# Patient Record
Sex: FEMALE | Race: WHITE | NOT HISPANIC OR LATINO | Employment: OTHER | URBAN - METROPOLITAN AREA
[De-identification: names, ages, dates, MRNs, and addresses within clinical notes are randomized per-mention and may not be internally consistent; named-entity substitution may affect disease eponyms.]

---

## 2017-09-07 ENCOUNTER — TRANSCRIBE ORDERS (OUTPATIENT)
Dept: ADMINISTRATIVE | Facility: HOSPITAL | Age: 57
End: 2017-09-07

## 2017-12-15 ENCOUNTER — TRANSCRIBE ORDERS (OUTPATIENT)
Dept: MAMMOGRAPHY | Facility: HOSPITAL | Age: 57
End: 2017-12-15

## 2017-12-15 DIAGNOSIS — Z12.31 VISIT FOR SCREENING MAMMOGRAM: Primary | ICD-10-CM

## 2018-01-05 ENCOUNTER — HOSPITAL ENCOUNTER (OUTPATIENT)
Dept: MAMMOGRAPHY | Facility: HOSPITAL | Age: 58
Discharge: HOME | End: 2018-01-05
Attending: OBSTETRICS & GYNECOLOGY | Admitting: OBSTETRICS & GYNECOLOGY

## 2018-01-05 DIAGNOSIS — Z12.31 VISIT FOR SCREENING MAMMOGRAM: ICD-10-CM

## 2018-01-05 PROCEDURE — 77063 BREAST TOMOSYNTHESIS BI: CPT

## 2018-01-05 PROCEDURE — 77067 SCR MAMMO BI INCL CAD: CPT

## 2018-01-05 PROCEDURE — 77067 SCR MAMMO BI INCL CAD: CPT | Performed by: RADIOLOGY

## 2018-01-05 PROCEDURE — 77063 BREAST TOMOSYNTHESIS BI: CPT | Performed by: RADIOLOGY

## 2018-08-04 ENCOUNTER — HOSPITAL ENCOUNTER (INPATIENT)
Facility: HOSPITAL | Age: 58
LOS: 3 days | Discharge: HOME | End: 2018-08-07
Attending: EMERGENCY MEDICINE | Admitting: INTERNAL MEDICINE

## 2018-08-04 DIAGNOSIS — R07.2 PRECORDIAL PAIN: ICD-10-CM

## 2018-08-04 DIAGNOSIS — K85.90 ACUTE PANCREATITIS WITHOUT INFECTION OR NECROSIS, UNSPECIFIED PANCREATITIS TYPE: Primary | ICD-10-CM

## 2018-08-04 PROBLEM — C50.919 BREAST CANCER (HCC): Status: ACTIVE | Noted: 2018-08-04

## 2018-08-04 PROBLEM — D72.829 LEUKOCYTOSIS: Status: ACTIVE | Noted: 2018-08-04

## 2018-08-04 LAB
ALBUMIN SERPL-MCNC: 4.37 G/DL (ref 3.2–4.8)
ALBUMIN/GLOB SERPL: 1.5 G/DL (ref 1.5–2.5)
ALP SERPL-CCNC: 80 U/L (ref 25–100)
ALT SERPL W P-5'-P-CCNC: 23 U/L (ref 7–40)
ANION GAP SERPL CALCULATED.3IONS-SCNC: 9 MMOL/L (ref 3–11)
ARTICHOKE IGE QN: 140 MG/DL (ref 0–130)
AST SERPL-CCNC: 23 U/L (ref 0–33)
BACTERIA UR QL AUTO: ABNORMAL /HPF
BASOPHILS # BLD AUTO: 0.03 10*3/MM3 (ref 0–0.2)
BASOPHILS NFR BLD AUTO: 0.3 % (ref 0–1)
BILIRUB SERPL-MCNC: 0.4 MG/DL (ref 0.3–1.2)
BILIRUB UR QL STRIP: NEGATIVE
BUN BLD-MCNC: 13 MG/DL (ref 9–23)
BUN/CREAT SERPL: 15.3 (ref 7–25)
CALCIUM SPEC-SCNC: 9.1 MG/DL (ref 8.7–10.4)
CHLORIDE SERPL-SCNC: 105 MMOL/L (ref 99–109)
CHOLEST SERPL-MCNC: 197 MG/DL (ref 0–200)
CLARITY UR: ABNORMAL
CO2 SERPL-SCNC: 28 MMOL/L (ref 20–31)
COLOR UR: YELLOW
CREAT BLD-MCNC: 0.85 MG/DL (ref 0.6–1.3)
DEPRECATED RDW RBC AUTO: 40.3 FL (ref 37–54)
EOSINOPHIL # BLD AUTO: 0.22 10*3/MM3 (ref 0–0.3)
EOSINOPHIL NFR BLD AUTO: 1.9 % (ref 0–3)
ERYTHROCYTE [DISTWIDTH] IN BLOOD BY AUTOMATED COUNT: 12.3 % (ref 11.3–14.5)
GFR SERPL CREATININE-BSD FRML MDRD: 69 ML/MIN/1.73
GLOBULIN UR ELPH-MCNC: 2.8 GM/DL
GLUCOSE BLD-MCNC: 100 MG/DL (ref 70–100)
GLUCOSE UR STRIP-MCNC: NEGATIVE MG/DL
HCT VFR BLD AUTO: 43.3 % (ref 34.5–44)
HDLC SERPL-MCNC: 67 MG/DL (ref 40–60)
HGB BLD-MCNC: 14.8 G/DL (ref 11.5–15.5)
HGB UR QL STRIP.AUTO: NEGATIVE
HOLD SPECIMEN: NORMAL
HOLD SPECIMEN: NORMAL
HYALINE CASTS UR QL AUTO: ABNORMAL /LPF
IMM GRANULOCYTES # BLD: 0.04 10*3/MM3 (ref 0–0.03)
IMM GRANULOCYTES NFR BLD: 0.3 % (ref 0–0.6)
KETONES UR QL STRIP: NEGATIVE
LEUKOCYTE ESTERASE UR QL STRIP.AUTO: NEGATIVE
LIPASE SERPL-CCNC: >3500 U/L (ref 6–51)
LYMPHOCYTES # BLD AUTO: 1.55 10*3/MM3 (ref 0.6–4.8)
LYMPHOCYTES NFR BLD AUTO: 13.5 % (ref 24–44)
MCH RBC QN AUTO: 31 PG (ref 27–31)
MCHC RBC AUTO-ENTMCNC: 34.2 G/DL (ref 32–36)
MCV RBC AUTO: 90.6 FL (ref 80–99)
MONOCYTES # BLD AUTO: 0.74 10*3/MM3 (ref 0–1)
MONOCYTES NFR BLD AUTO: 6.4 % (ref 0–12)
NEUTROPHILS # BLD AUTO: 8.97 10*3/MM3 (ref 1.5–8.3)
NEUTROPHILS NFR BLD AUTO: 77.9 % (ref 41–71)
NITRITE UR QL STRIP: NEGATIVE
PH UR STRIP.AUTO: 8.5 [PH] (ref 5–8)
PLATELET # BLD AUTO: 301 10*3/MM3 (ref 150–450)
PMV BLD AUTO: 8.8 FL (ref 6–12)
POTASSIUM BLD-SCNC: 4.2 MMOL/L (ref 3.5–5.5)
PROT SERPL-MCNC: 7.2 G/DL (ref 5.7–8.2)
PROT UR QL STRIP: ABNORMAL
RBC # BLD AUTO: 4.78 10*6/MM3 (ref 3.89–5.14)
RBC # UR: ABNORMAL /HPF
REF LAB TEST METHOD: ABNORMAL
SODIUM BLD-SCNC: 142 MMOL/L (ref 132–146)
SP GR UR STRIP: 1.02 (ref 1–1.03)
SQUAMOUS #/AREA URNS HPF: ABNORMAL /HPF
TRIGL SERPL-MCNC: 86 MG/DL (ref 0–150)
TROPONIN I SERPL-MCNC: <0.006 NG/ML
UROBILINOGEN UR QL STRIP: ABNORMAL
WBC NRBC COR # BLD: 11.51 10*3/MM3 (ref 3.5–10.8)
WBC UR QL AUTO: ABNORMAL /HPF
WHOLE BLOOD HOLD SPECIMEN: NORMAL
WHOLE BLOOD HOLD SPECIMEN: NORMAL

## 2018-08-04 PROCEDURE — 25010000002 ONDANSETRON PER 1 MG: Performed by: EMERGENCY MEDICINE

## 2018-08-04 PROCEDURE — 25010000002 MORPHINE PER 10 MG: Performed by: EMERGENCY MEDICINE

## 2018-08-04 PROCEDURE — 93005 ELECTROCARDIOGRAM TRACING: CPT | Performed by: EMERGENCY MEDICINE

## 2018-08-04 PROCEDURE — 83690 ASSAY OF LIPASE: CPT | Performed by: EMERGENCY MEDICINE

## 2018-08-04 PROCEDURE — 85025 COMPLETE CBC W/AUTO DIFF WBC: CPT | Performed by: EMERGENCY MEDICINE

## 2018-08-04 PROCEDURE — 25010000002 HYDROMORPHONE PER 4 MG: Performed by: INTERNAL MEDICINE

## 2018-08-04 PROCEDURE — G0378 HOSPITAL OBSERVATION PER HR: HCPCS

## 2018-08-04 PROCEDURE — 99220 PR INITIAL OBSERVATION CARE/DAY 70 MINUTES: CPT | Performed by: INTERNAL MEDICINE

## 2018-08-04 PROCEDURE — 80053 COMPREHEN METABOLIC PANEL: CPT | Performed by: EMERGENCY MEDICINE

## 2018-08-04 PROCEDURE — 81001 URINALYSIS AUTO W/SCOPE: CPT | Performed by: EMERGENCY MEDICINE

## 2018-08-04 PROCEDURE — 84484 ASSAY OF TROPONIN QUANT: CPT | Performed by: EMERGENCY MEDICINE

## 2018-08-04 PROCEDURE — 80061 LIPID PANEL: CPT | Performed by: INTERNAL MEDICINE

## 2018-08-04 PROCEDURE — 25010000002 ONDANSETRON PER 1 MG: Performed by: INTERNAL MEDICINE

## 2018-08-04 PROCEDURE — 99285 EMERGENCY DEPT VISIT HI MDM: CPT

## 2018-08-04 RX ORDER — HYDROMORPHONE HYDROCHLORIDE 1 MG/ML
0.5 INJECTION, SOLUTION INTRAMUSCULAR; INTRAVENOUS; SUBCUTANEOUS
Status: DISCONTINUED | OUTPATIENT
Start: 2018-08-04 | End: 2018-08-07 | Stop reason: HOSPADM

## 2018-08-04 RX ORDER — CETIRIZINE HYDROCHLORIDE 10 MG/1
10 TABLET ORAL DAILY
Status: DISCONTINUED | OUTPATIENT
Start: 2018-08-05 | End: 2018-08-07 | Stop reason: HOSPADM

## 2018-08-04 RX ORDER — SODIUM CHLORIDE 0.9 % (FLUSH) 0.9 %
10 SYRINGE (ML) INJECTION AS NEEDED
Status: DISCONTINUED | OUTPATIENT
Start: 2018-08-04 | End: 2018-08-07 | Stop reason: HOSPADM

## 2018-08-04 RX ORDER — HYDROCODONE BITARTRATE AND ACETAMINOPHEN 5; 325 MG/1; MG/1
1 TABLET ORAL EVERY 4 HOURS PRN
Status: DISCONTINUED | OUTPATIENT
Start: 2018-08-04 | End: 2018-08-05

## 2018-08-04 RX ORDER — MORPHINE SULFATE 4 MG/ML
4 INJECTION, SOLUTION INTRAMUSCULAR; INTRAVENOUS ONCE
Status: COMPLETED | OUTPATIENT
Start: 2018-08-04 | End: 2018-08-04

## 2018-08-04 RX ORDER — NALOXONE HCL 0.4 MG/ML
0.4 VIAL (ML) INJECTION
Status: DISCONTINUED | OUTPATIENT
Start: 2018-08-04 | End: 2018-08-07 | Stop reason: HOSPADM

## 2018-08-04 RX ORDER — DOCUSATE SODIUM 100 MG/1
100 CAPSULE, LIQUID FILLED ORAL 2 TIMES DAILY
Status: DISCONTINUED | OUTPATIENT
Start: 2018-08-04 | End: 2018-08-07 | Stop reason: HOSPADM

## 2018-08-04 RX ORDER — FLUTICASONE PROPIONATE 50 MCG
2 SPRAY, SUSPENSION (ML) NASAL DAILY
Status: DISCONTINUED | OUTPATIENT
Start: 2018-08-05 | End: 2018-08-07 | Stop reason: HOSPADM

## 2018-08-04 RX ORDER — SODIUM CHLORIDE 0.9 % (FLUSH) 0.9 %
1-10 SYRINGE (ML) INJECTION AS NEEDED
Status: DISCONTINUED | OUTPATIENT
Start: 2018-08-04 | End: 2018-08-07 | Stop reason: HOSPADM

## 2018-08-04 RX ORDER — ASPIRIN 81 MG/1
81 TABLET, CHEWABLE ORAL DAILY
Status: DISCONTINUED | OUTPATIENT
Start: 2018-08-05 | End: 2018-08-07 | Stop reason: HOSPADM

## 2018-08-04 RX ORDER — FLUTICASONE PROPIONATE 50 MCG
2 SPRAY, SUSPENSION (ML) NASAL DAILY
COMMUNITY

## 2018-08-04 RX ORDER — SODIUM CHLORIDE 9 MG/ML
150 INJECTION, SOLUTION INTRAVENOUS CONTINUOUS
Status: DISCONTINUED | OUTPATIENT
Start: 2018-08-04 | End: 2018-08-06

## 2018-08-04 RX ORDER — ASPIRIN 81 MG/1
81 TABLET, CHEWABLE ORAL DAILY
COMMUNITY

## 2018-08-04 RX ORDER — ACETAMINOPHEN 325 MG/1
650 TABLET ORAL EVERY 4 HOURS PRN
Status: DISCONTINUED | OUTPATIENT
Start: 2018-08-04 | End: 2018-08-05

## 2018-08-04 RX ORDER — ONDANSETRON 2 MG/ML
4 INJECTION INTRAMUSCULAR; INTRAVENOUS ONCE
Status: COMPLETED | OUTPATIENT
Start: 2018-08-04 | End: 2018-08-04

## 2018-08-04 RX ORDER — ONDANSETRON 2 MG/ML
4 INJECTION INTRAMUSCULAR; INTRAVENOUS EVERY 6 HOURS PRN
Status: DISCONTINUED | OUTPATIENT
Start: 2018-08-04 | End: 2018-08-07 | Stop reason: HOSPADM

## 2018-08-04 RX ADMIN — ONDANSETRON 4 MG: 2 INJECTION INTRAMUSCULAR; INTRAVENOUS at 20:59

## 2018-08-04 RX ADMIN — HYDROMORPHONE HYDROCHLORIDE 0.5 MG: 1 INJECTION, SOLUTION INTRAMUSCULAR; INTRAVENOUS; SUBCUTANEOUS at 17:32

## 2018-08-04 RX ADMIN — ONDANSETRON 4 MG: 2 INJECTION INTRAMUSCULAR; INTRAVENOUS at 14:53

## 2018-08-04 RX ADMIN — MORPHINE SULFATE 4 MG: 4 INJECTION, SOLUTION INTRAMUSCULAR; INTRAVENOUS at 12:09

## 2018-08-04 RX ADMIN — SODIUM CHLORIDE 500 ML: 9 INJECTION, SOLUTION INTRAVENOUS at 14:54

## 2018-08-04 RX ADMIN — SODIUM CHLORIDE 1000 ML: 9 INJECTION, SOLUTION INTRAVENOUS at 12:09

## 2018-08-04 RX ADMIN — HYDROMORPHONE HYDROCHLORIDE 0.5 MG: 1 INJECTION, SOLUTION INTRAMUSCULAR; INTRAVENOUS; SUBCUTANEOUS at 14:53

## 2018-08-04 RX ADMIN — ONDANSETRON 4 MG: 2 INJECTION INTRAMUSCULAR; INTRAVENOUS at 12:10

## 2018-08-04 RX ADMIN — SODIUM CHLORIDE 200 ML/HR: 9 INJECTION, SOLUTION INTRAVENOUS at 14:54

## 2018-08-04 NOTE — ED PROVIDER NOTES
Subjective   Jackelyn Diaz is a 57 y.o.female who presents to the ED with complaints of epigastric abdominal pain. The patient reports her pain started a couple days ago but worsened last night around 0200. She states she has been awake since then due to her pain. Her pain radiates into her chest and back. She has not taken any medication for her pain. She also complains of nausea and chills, but denies any vomiting, diarrhea, constipation, or fever. She was seen by her PCP at 0900 today, who sent her here for further evaluation. She was recently given antibiotics to treat her URI. She has a history of pancreatitis. There are no other complaints at this time.         History provided by:  Patient and spouse  Abdominal Pain   Pain location:  Epigastric  Pain quality: aching    Pain radiates to:  Chest and back  Pain severity:  Moderate  Onset quality:  Sudden  Duration: a couple days.  Timing:  Constant  Progression:  Worsening  Chronicity:  New  Context: awakening from sleep    Relieved by:  None tried  Worsened by:  Nothing  Ineffective treatments:  None tried  Associated symptoms: chills and nausea    Associated symptoms: no constipation, no diarrhea, no fever and no vomiting        Review of Systems   Constitutional: Positive for chills. Negative for fever.   Gastrointestinal: Positive for abdominal pain (epigastric) and nausea. Negative for constipation, diarrhea and vomiting.   All other systems reviewed and are negative.      Past Medical History:   Diagnosis Date   • Breast cancer (CMS/HCC) 2012   • Hx of radiation therapy 2012       Allergies   Allergen Reactions   • Tamoxifen Itching       Past Surgical History:   Procedure Laterality Date   • BREAST BIOPSY Right    • BREAST BIOPSY Right 2011   • BREAST EXCISIONAL BIOPSY Left 2012   • BREAST LUMPECTOMY Left 2012   • HYSTERECTOMY Bilateral 2014       Family History   Problem Relation Age of Onset   • Breast cancer Neg Hx    • Ovarian cancer Neg Hx   "      Social History     Social History   • Marital status:      Social History Main Topics   • Smoking status: Never Smoker   • Smokeless tobacco: Never Used   • Alcohol use No   • Drug use: Unknown     Other Topics Concern   • Not on file         Objective   Physical Exam   Constitutional: She is oriented to person, place, and time. She appears well-developed and well-nourished. No distress.   HENT:   Head: Normocephalic and atraumatic.   Nose: Nose normal.   Eyes: Conjunctivae are normal. No scleral icterus.   Neck: Normal range of motion. Neck supple.   Cardiovascular: Normal rate, regular rhythm and normal heart sounds.    No murmur heard.  Pulmonary/Chest: Effort normal and breath sounds normal. No respiratory distress.   Abdominal: Soft. Bowel sounds are normal. There is tenderness (mild epigastric tenderness).   Musculoskeletal: Normal range of motion. She exhibits no edema.   Neurological: She is alert and oriented to person, place, and time.   Skin: Skin is warm and dry.   Psychiatric: She has a normal mood and affect. Her behavior is normal.   Nursing note and vitals reviewed.      Procedures         ED Course     Pt appears to have been given Keflex, prednisone and a PPI for a \"URI\".    EKG NSR.  Labs benign except lipase which is >3500 c/w acute pancreatitis.  Patient stable on serial rechecks.  Discussed exam findings, test results so far and concerns in detail at the bedside.  Discussed need for admission for further evaluation and treatment.                  MDM  Number of Diagnoses or Management Options  Acute pancreatitis without infection or necrosis, unspecified pancreatitis type:      Amount and/or Complexity of Data Reviewed  Clinical lab tests: ordered and reviewed  Discuss the patient with other providers: yes  Independent visualization of images, tracings, or specimens: yes        Final diagnoses:   Acute pancreatitis without infection or necrosis, unspecified pancreatitis type "       Documentation assistance provided by shantell Conteh.  Information recorded by the scribe was done at my direction and has been verified and validated by me.     Rakesh Conteh  08/04/18 1148       Flaco Cohen MD  08/04/18 1237

## 2018-08-04 NOTE — H&P
[facility] Medicine Services  HISTORY AND PHYSICAL    Patient Name: Jackelyn Diaz  : 1960  MRN: [id number]  Primary Care Physician: Provider, No Known    Subjective   Subjective     Chief Complaint:  Epigastric pain    HPI:  Jackelyn Diaz is a 57 y.o. female with history of breast cancer and 3-4 prior episodes of pancreatitis presents with epigastric pain. Onset at 2 am and constant in nature. Does not radiate. Sharp in character. Similar to prior episodes of pancreatitis. Slight associated nausea. Denies fever, some chills this morning.     Review of Systems   Constitutional: Positive for chills. Negative for fever.   HENT: Negative.    Eyes: Negative.    Respiratory: Positive for shortness of breath. Negative for cough.    Cardiovascular: Negative for chest pain and palpitations.   Gastrointestinal: Positive for abdominal pain and nausea. Negative for vomiting.   Endocrine: Negative.    Genitourinary: Negative.    Musculoskeletal: Negative.    Skin: Negative.    Allergic/Immunologic: Negative.    Neurological: Negative.    Hematological: Negative.    Psychiatric/Behavioral: Negative.           Otherwise 10-system ROS reviewed and is negative except as mentioned in the HPI.    Personal History     Past Medical History:   Diagnosis Date   • Breast cancer (CMS/HCC)    • Hx of radiation therapy        Past Surgical History:   Procedure Laterality Date   • BREAST BIOPSY Right    • BREAST BIOPSY Right    • BREAST EXCISIONAL BIOPSY Left    • BREAST LUMPECTOMY Left    • HYSTERECTOMY Bilateral        Family History: family history is not on file. Reviewed with patient and non contributory    Social History:  reports that she has never smoked. She has never used smokeless tobacco. She reports that she does not drink alcohol.  Social History     Social History Narrative   • No narrative on file       Medications:  Prescriptions Prior to Admission   Medication Sig  Dispense Refill Last Dose   • aspirin 81 MG chewable tablet Chew 81 mg Daily.      • fluticasone (FLONASE) 50 MCG/ACT nasal spray 2 sprays into the nostril(s) as directed by provider Daily.      • LORATADINE ALLERGY RELIEF PO Take  by mouth.          Allergies   Allergen Reactions   • Tamoxifen Itching       Objective   Objective     Vital Signs:   Temp:  [98.3 °F (36.8 °C)] 98.3 °F (36.8 °C)  Heart Rate:  [] 78  Resp:  [18] 18  BP: (123-138)/(78-85) 138/78        Physical Exam   Constitutional -no acute distress, non toxic, in bed  HEENT-NCAT, mucous membranes moist  CV-RRR, S1 S2 normal, no m/r/g  Resp-CTAB, no wheezes, rhonchi or rales  Abd-soft, non-tender, non-distended, normo active bowel sounds, overweight  Ext-No lower extremity cyanosis, clubbing or edema bilaterally  Neuro-alert and oriented x 3, speech clear, moves all extremities   Psych-normal affect   Skin- No rash on exposed UE or LE bilaterally      Results Reviewed:  I have personally reviewed current lab, radiology, and data and agree.      Results from last 7 days  Lab Units 08/04/18  1047   WBC 10*3/mm3 11.51*   HEMOGLOBIN g/dL 14.8   HEMATOCRIT % 43.3   PLATELETS 10*3/mm3 301       Results from last 7 days  Lab Units 08/04/18  1047   SODIUM mmol/L 142   POTASSIUM mmol/L 4.2   CHLORIDE mmol/L 105   CO2 mmol/L 28.0   BUN mg/dL 13   CREATININE mg/dL 0.85   GLUCOSE mg/dL 100   CALCIUM mg/dL 9.1   ALT (SGPT) U/L 23   AST (SGOT) U/L 23   TROPONIN I ng/mL <0.006     Estimated Creatinine Clearance: 71.1 mL/min (by C-G formula based on SCr of 0.85 mg/dL).  Brief Urine Lab Results  (Last result in the past 365 days)      Color   Clarity   Blood   Leuk Est   Nitrite   Protein   CREAT   Urine HCG        08/04/18 1126 Yellow Turbid(A) Negative Negative Negative 30 mg/dL (1+)(A)             No results found for: BNP  Imaging Results (last 24 hours)     ** No results found for the last 24 hours. **             Assessment/Plan   Assessment / Plan  "    Hospital Problem List     Acute pancreatitis without infection or necrosis            Assessment & Plan:    Acute pancreatitis  - patient says she was diagnosed with \"idiopathic pancreatitis\" in the past (3-4 prior episodes) as no etiology was found.   - Prior cholecystectomy, no elevation of LFTs  - denies EtOH use  - no offending medications  - calcium not elevated  - patient denies history of elevated triglycerides, but will check lipid panel  - for now, will provide brisk IV fluids  - NPO today, likely clears diet tomorrow  - consider CT abdomen if clinical worsening  - check cbc, cmp and lipase in am  - needs outpatient GI followup +/- MRCP    Leukocytosis  - likely leukemoid reaction secondary to acute pancreatitis and/or recent steroids    Recent URI  - no current cough or fever, finished recent antibiotics/steroids    Recent Chest Pain  - episode 5 weeks ago while on vacation, left sided chest pain with arm numbness.  - no current chest pain, EKG sinus  - continue asa 81 mg daily  - has outpatient cardiology followup scheduled.    DVT prophylaxis: lovenox    CODE STATUS:    Code Status and Medical Interventions:   Ordered at: 08/04/18 1427     Level Of Support Discussed With:    Patient     Code Status:    CPR     Medical Interventions (Level of Support Prior to Arrest):    Full       Admission Status:  I believe this patient meets OBSERVATION status, however if further evaluation or treatment plans warrant, status may change.  Based upon current information, I predict patient's care encounter to be less than or equal to 2 midnights.      Electronically signed by Fili Biswas MD, 08/04/18, 2:32 PM.      "

## 2018-08-05 ENCOUNTER — APPOINTMENT (OUTPATIENT)
Dept: ULTRASOUND IMAGING | Facility: HOSPITAL | Age: 58
End: 2018-08-05

## 2018-08-05 LAB
ALBUMIN SERPL-MCNC: 3.76 G/DL (ref 3.2–4.8)
ALBUMIN/GLOB SERPL: 1.8 G/DL (ref 1.5–2.5)
ALP SERPL-CCNC: 137 U/L (ref 25–100)
ALT SERPL W P-5'-P-CCNC: 245 U/L (ref 7–40)
ANION GAP SERPL CALCULATED.3IONS-SCNC: 7 MMOL/L (ref 3–11)
AST SERPL-CCNC: 205 U/L (ref 0–33)
BILIRUB SERPL-MCNC: 0.6 MG/DL (ref 0.3–1.2)
BUN BLD-MCNC: 10 MG/DL (ref 9–23)
BUN/CREAT SERPL: 15.6 (ref 7–25)
CALCIUM SPEC-SCNC: 8 MG/DL (ref 8.7–10.4)
CHLORIDE SERPL-SCNC: 108 MMOL/L (ref 99–109)
CO2 SERPL-SCNC: 24 MMOL/L (ref 20–31)
CREAT BLD-MCNC: 0.64 MG/DL (ref 0.6–1.3)
DEPRECATED RDW RBC AUTO: 41.8 FL (ref 37–54)
ERYTHROCYTE [DISTWIDTH] IN BLOOD BY AUTOMATED COUNT: 12.4 % (ref 11.3–14.5)
GFR SERPL CREATININE-BSD FRML MDRD: 96 ML/MIN/1.73
GLOBULIN UR ELPH-MCNC: 2.1 GM/DL
GLUCOSE BLD-MCNC: 94 MG/DL (ref 70–100)
HCT VFR BLD AUTO: 41.7 % (ref 34.5–44)
HGB BLD-MCNC: 13.8 G/DL (ref 11.5–15.5)
LIPASE SERPL-CCNC: 2603 U/L (ref 6–51)
MAGNESIUM SERPL-MCNC: 2.2 MG/DL (ref 1.3–2.7)
MCH RBC QN AUTO: 30.9 PG (ref 27–31)
MCHC RBC AUTO-ENTMCNC: 33.1 G/DL (ref 32–36)
MCV RBC AUTO: 93.3 FL (ref 80–99)
PLATELET # BLD AUTO: 246 10*3/MM3 (ref 150–450)
PMV BLD AUTO: 8.9 FL (ref 6–12)
POTASSIUM BLD-SCNC: 4.1 MMOL/L (ref 3.5–5.5)
PROT SERPL-MCNC: 5.9 G/DL (ref 5.7–8.2)
RBC # BLD AUTO: 4.47 10*6/MM3 (ref 3.89–5.14)
SODIUM BLD-SCNC: 139 MMOL/L (ref 132–146)
WBC NRBC COR # BLD: 9.92 10*3/MM3 (ref 3.5–10.8)

## 2018-08-05 PROCEDURE — G0378 HOSPITAL OBSERVATION PER HR: HCPCS

## 2018-08-05 PROCEDURE — 76705 ECHO EXAM OF ABDOMEN: CPT

## 2018-08-05 PROCEDURE — 83690 ASSAY OF LIPASE: CPT | Performed by: INTERNAL MEDICINE

## 2018-08-05 PROCEDURE — 25010000002 ENOXAPARIN PER 10 MG: Performed by: INTERNAL MEDICINE

## 2018-08-05 PROCEDURE — 25010000002 HYDROMORPHONE PER 4 MG: Performed by: INTERNAL MEDICINE

## 2018-08-05 PROCEDURE — 80053 COMPREHEN METABOLIC PANEL: CPT | Performed by: INTERNAL MEDICINE

## 2018-08-05 PROCEDURE — 25010000002 ONDANSETRON PER 1 MG: Performed by: INTERNAL MEDICINE

## 2018-08-05 PROCEDURE — 85027 COMPLETE CBC AUTOMATED: CPT | Performed by: INTERNAL MEDICINE

## 2018-08-05 PROCEDURE — 83735 ASSAY OF MAGNESIUM: CPT | Performed by: INTERNAL MEDICINE

## 2018-08-05 PROCEDURE — 99233 SBSQ HOSP IP/OBS HIGH 50: CPT | Performed by: INTERNAL MEDICINE

## 2018-08-05 RX ORDER — OXYCODONE HYDROCHLORIDE 5 MG/1
5 TABLET ORAL EVERY 4 HOURS PRN
Status: DISCONTINUED | OUTPATIENT
Start: 2018-08-05 | End: 2018-08-07 | Stop reason: HOSPADM

## 2018-08-05 RX ADMIN — ONDANSETRON 4 MG: 2 INJECTION INTRAMUSCULAR; INTRAVENOUS at 17:58

## 2018-08-05 RX ADMIN — ONDANSETRON 4 MG: 2 INJECTION INTRAMUSCULAR; INTRAVENOUS at 09:12

## 2018-08-05 RX ADMIN — HYDROMORPHONE HYDROCHLORIDE 0.5 MG: 1 INJECTION, SOLUTION INTRAMUSCULAR; INTRAVENOUS; SUBCUTANEOUS at 09:12

## 2018-08-05 RX ADMIN — ENOXAPARIN SODIUM 40 MG: 100 INJECTION SUBCUTANEOUS at 09:12

## 2018-08-05 RX ADMIN — FLUTICASONE PROPIONATE 2 SPRAY: 50 SPRAY, METERED NASAL at 09:12

## 2018-08-05 NOTE — PLAN OF CARE
Problem: Patient Care Overview  Goal: Plan of Care Review  Outcome: Ongoing (interventions implemented as appropriate)    Goal: Individualization and Mutuality  Outcome: Ongoing (interventions implemented as appropriate)    Goal: Discharge Needs Assessment  Outcome: Ongoing (interventions implemented as appropriate)    Goal: Interprofessional Rounds/Family Conf  Outcome: Ongoing (interventions implemented as appropriate)      Problem: Pancreatitis, Acute/Chronic (Adult)  Goal: Signs and Symptoms of Listed Potential Problems Will be Absent, Minimized or Managed (Pancreatitis, Acute/Chronic)  Outcome: Ongoing (interventions implemented as appropriate)      Problem: Pain, Acute (Adult)  Goal: Identify Related Risk Factors and Signs and Symptoms  Outcome: Ongoing (interventions implemented as appropriate)    Goal: Acceptable Pain Control/Comfort Level  Outcome: Ongoing (interventions implemented as appropriate)

## 2018-08-05 NOTE — PROGRESS NOTES
[facility] Medicine Services  PROGRESS NOTE    Patient Name: Jackelyn Diaz  : 1960  MRN: [id number]    Date of Admission: 2018  Length of Stay: 1  Primary Care Physician: Provider, No Known    Subjective   Subjective     CC:  pancreatitis    HPI:  Less abdominal pain today. Some bilious emesis overnight, improved today. Would like to try some ice chips and perhaps a liquid diet.    Review of Systems  Gen- No fevers, chills  CV- No chest pain, palpitations  Resp- No cough, dyspnea  GI- some n/v but improving, abd pain (epigastric) improved as well.      Otherwise ROS is negative except as mentioned in the HPI.    Objective   Objective     Vital Signs:   Temp:  [97.6 °F (36.4 °C)-98.9 °F (37.2 °C)] 98.6 °F (37 °C)  Heart Rate:  [] 82  Resp:  [18] 18  BP: (120-140)/(66-81) 140/75        Physical Exam:  Constitutional -no acute distress, non toxic, in bed  HEENT-NCAT, mucous membranes moist  CV-RRR, S1 S2 normal, no m/r/g  Resp-CTAB, no wheezes, rhonchi or rales  Abd-soft, non-tender, non-distended, normo active bowel sounds; overweight  Ext-No lower extremity cyanosis, clubbing or edema bilaterally  Neuro-alert and oriented x 3, speech clear, moves all extremities   Psych-normal affect   Skin- No rash on exposed UE or LE bilaterally      Results Reviewed:  I have personally reviewed current lab, radiology, and data and agree.      Results from last 7 days  Lab Units 18  0429 18  1047   WBC 10*3/mm3 9.92 11.51*   HEMOGLOBIN g/dL 13.8 14.8   HEMATOCRIT % 41.7 43.3   PLATELETS 10*3/mm3 246 301       Results from last 7 days  Lab Units 18  0429 18  1047   SODIUM mmol/L 139 142   POTASSIUM mmol/L 4.1 4.2   CHLORIDE mmol/L 108 105   CO2 mmol/L 24.0 28.0   BUN mg/dL 10 13   CREATININE mg/dL 0.64 0.85   GLUCOSE mg/dL 94 100   CALCIUM mg/dL 8.0* 9.1   ALT (SGPT) U/L 245* 23   AST (SGOT) U/L 205* 23   TROPONIN I ng/mL  --  <0.006     Estimated Creatinine  "Clearance: 94.4 mL/min (by C-G formula based on SCr of 0.64 mg/dL).  No results found for: BNP    Microbiology Results Abnormal     None          Imaging Results (last 24 hours)     ** No results found for the last 24 hours. **             I have reviewed the medications.    aspirin 81 mg Oral Daily   cetirizine 10 mg Oral Daily   docusate sodium 100 mg Oral BID   enoxaparin 40 mg Subcutaneous Q24H   fluticasone 2 spray Nasal Daily         Assessment/Plan   Assessment / Plan     Hospital Problem List     * (Principal)Acute pancreatitis without infection or necrosis    Leukocytosis    Breast cancer (CMS/HCC)             Brief Hospital Course to date:  Jackelyn Diaz is a 57 y.o. female several prior bouts of pancreatitis presents with epigastric pain and elevated lipase.      Assessment & Plan:    Acute Pancreatitis  - patient has experienced several similar episodes over the past 20 years and has been told she has \"idiopathic pancreatitis.\"  Prior history of cholecystectomy, denies alcohol use, no elevation in triglycerides.  - clinically improving but elevation of LFTs today somewhat perplexing, as not present on lab work yesterday. Nausea and vomiting reported last night, could certainly have contributed to LFT elevation noted today. No elevation in bilirubin to suggest retained stone or sludge, but will obtain ultrasound of the RUQ today. Repeat CMP in am  - continue IV fluids and supportive care  - start clear liquid diet today if no further nausea and patient feels like eating.  - will need further workup of these bouts of pancreatitis with Gastroenterology (IP vs OP), likely with an MRCP to rule out pancreas divisum or other pathology     Leukocytosis  - likely leukemoid reaction secondary to acute pancreatitis and/or recent steroids, resolved     Recent URI  - no current cough or fever, finished recent antibiotics/steroids     Recent Chest Pain  - episode 5 weeks ago while on vacation, left sided chest pain " with arm numbness.  - no current chest pain, EKG sinus  - continue asa 81 mg daily  - has outpatient cardiology followup scheduled.    DVT Prophylaxis:  lovenox    CODE STATUS:   Code Status and Medical Interventions:   Ordered at: 08/04/18 1427     Level Of Support Discussed With:    Patient     Code Status:    CPR     Medical Interventions (Level of Support Prior to Arrest):    Full       Disposition: I expect the patient to be discharged home in 1-2 days.      Electronically signed by Fili Biswas MD, 08/05/18, 10:52 AM.

## 2018-08-05 NOTE — PLAN OF CARE
Problem: Patient Care Overview  Goal: Plan of Care Review  Outcome: Ongoing (interventions implemented as appropriate)   08/05/18 0928   Coping/Psychosocial   Plan of Care Reviewed With patient   OTHER   Outcome Summary Pt had a good night. Minimal complaint of pain. VSS on room air. Possible D/C today or tomorrow based on MD notes.        Problem: Pancreatitis, Acute/Chronic (Adult)  Goal: Signs and Symptoms of Listed Potential Problems Will be Absent, Minimized or Managed (Pancreatitis, Acute/Chronic)  Outcome: Ongoing (interventions implemented as appropriate)      Problem: Pain, Acute (Adult)  Goal: Identify Related Risk Factors and Signs and Symptoms  Outcome: Ongoing (interventions implemented as appropriate)    Goal: Acceptable Pain Control/Comfort Level  Outcome: Ongoing (interventions implemented as appropriate)

## 2018-08-06 LAB
ALBUMIN SERPL-MCNC: 3.7 G/DL (ref 3.2–4.8)
ALBUMIN/GLOB SERPL: 1.8 G/DL (ref 1.5–2.5)
ALP SERPL-CCNC: 118 U/L (ref 25–100)
ALT SERPL W P-5'-P-CCNC: 146 U/L (ref 7–40)
ANION GAP SERPL CALCULATED.3IONS-SCNC: 12 MMOL/L (ref 3–11)
AST SERPL-CCNC: 72 U/L (ref 0–33)
BILIRUB SERPL-MCNC: 0.5 MG/DL (ref 0.3–1.2)
BUN BLD-MCNC: 8 MG/DL (ref 9–23)
BUN/CREAT SERPL: 14 (ref 7–25)
CALCIUM SPEC-SCNC: 8 MG/DL (ref 8.7–10.4)
CHLORIDE SERPL-SCNC: 107 MMOL/L (ref 99–109)
CO2 SERPL-SCNC: 24 MMOL/L (ref 20–31)
CREAT BLD-MCNC: 0.57 MG/DL (ref 0.6–1.3)
DEPRECATED RDW RBC AUTO: 41 FL (ref 37–54)
ERYTHROCYTE [DISTWIDTH] IN BLOOD BY AUTOMATED COUNT: 12.1 % (ref 11.3–14.5)
GFR SERPL CREATININE-BSD FRML MDRD: 110 ML/MIN/1.73
GLOBULIN UR ELPH-MCNC: 2.1 GM/DL
GLUCOSE BLD-MCNC: 89 MG/DL (ref 70–100)
HCT VFR BLD AUTO: 41 % (ref 34.5–44)
HGB BLD-MCNC: 13.7 G/DL (ref 11.5–15.5)
LIPASE SERPL-CCNC: 2454 U/L (ref 6–51)
MCH RBC QN AUTO: 30.9 PG (ref 27–31)
MCHC RBC AUTO-ENTMCNC: 33.4 G/DL (ref 32–36)
MCV RBC AUTO: 92.6 FL (ref 80–99)
PLATELET # BLD AUTO: 252 10*3/MM3 (ref 150–450)
PMV BLD AUTO: 9 FL (ref 6–12)
POTASSIUM BLD-SCNC: 3.7 MMOL/L (ref 3.5–5.5)
PROT SERPL-MCNC: 5.8 G/DL (ref 5.7–8.2)
RBC # BLD AUTO: 4.43 10*6/MM3 (ref 3.89–5.14)
SODIUM BLD-SCNC: 143 MMOL/L (ref 132–146)
WBC NRBC COR # BLD: 10.04 10*3/MM3 (ref 3.5–10.8)

## 2018-08-06 PROCEDURE — 85027 COMPLETE CBC AUTOMATED: CPT | Performed by: INTERNAL MEDICINE

## 2018-08-06 PROCEDURE — 25010000002 ONDANSETRON PER 1 MG: Performed by: INTERNAL MEDICINE

## 2018-08-06 PROCEDURE — 99233 SBSQ HOSP IP/OBS HIGH 50: CPT | Performed by: INTERNAL MEDICINE

## 2018-08-06 PROCEDURE — 25010000002 ENOXAPARIN PER 10 MG: Performed by: INTERNAL MEDICINE

## 2018-08-06 PROCEDURE — 80053 COMPREHEN METABOLIC PANEL: CPT | Performed by: INTERNAL MEDICINE

## 2018-08-06 PROCEDURE — 83690 ASSAY OF LIPASE: CPT | Performed by: INTERNAL MEDICINE

## 2018-08-06 RX ORDER — DEXTROSE, SODIUM CHLORIDE, AND POTASSIUM CHLORIDE 5; .45; .15 G/100ML; G/100ML; G/100ML
100 INJECTION INTRAVENOUS CONTINUOUS
Status: DISCONTINUED | OUTPATIENT
Start: 2018-08-06 | End: 2018-08-07 | Stop reason: HOSPADM

## 2018-08-06 RX ADMIN — SODIUM CHLORIDE 150 ML/HR: 9 INJECTION, SOLUTION INTRAVENOUS at 10:13

## 2018-08-06 RX ADMIN — POTASSIUM CHLORIDE, DEXTROSE MONOHYDRATE AND SODIUM CHLORIDE 100 ML/HR: 150; 5; 450 INJECTION, SOLUTION INTRAVENOUS at 13:26

## 2018-08-06 RX ADMIN — ENOXAPARIN SODIUM 40 MG: 100 INJECTION SUBCUTANEOUS at 10:12

## 2018-08-06 RX ADMIN — ONDANSETRON 4 MG: 2 INJECTION INTRAMUSCULAR; INTRAVENOUS at 07:56

## 2018-08-06 NOTE — PLAN OF CARE
Problem: Patient Care Overview  Goal: Plan of Care Review  Outcome: Ongoing (interventions implemented as appropriate)   08/06/18 0550   Coping/Psychosocial   Plan of Care Reviewed With patient;spouse   OTHER   Outcome Summary Pt had a good night. VSS on room air. Minimal complaints of pain/ nausea. Possible D/C tomorrow.        Problem: Pancreatitis, Acute/Chronic (Adult)  Goal: Signs and Symptoms of Listed Potential Problems Will be Absent, Minimized or Managed (Pancreatitis, Acute/Chronic)  Outcome: Ongoing (interventions implemented as appropriate)      Problem: Pain, Acute (Adult)  Goal: Identify Related Risk Factors and Signs and Symptoms  Outcome: Ongoing (interventions implemented as appropriate)    Goal: Acceptable Pain Control/Comfort Level  Outcome: Ongoing (interventions implemented as appropriate)

## 2018-08-06 NOTE — PROGRESS NOTES
[facility] Medicine Services  PROGRESS NOTE    Patient Name: Jackelyn Diaz  : 1960  MRN: [id number]    Date of Admission: 2018  Length of Stay: 1  Primary Care Physician: Provider, No Known    Subjective   Subjective     CC:  pancreatitis    HPI:  Patient still feels full with poor appetite and ongoing nausea- though pain is improved.  Also reports bout of intermittent confusion and left side chest pain while in [place]-  Review of Systems  Gen- No fevers, chills  CV- No chest pain, palpitations  Resp- No cough, dyspnea  GI- some n/v but improving, abd pain (epigastric) improved as well.      Otherwise ROS is negative except as mentioned in the HPI.    Objective   Objective     Vital Signs:   Temp:  [98.6 °F (37 °C)-99 °F (37.2 °C)] 98.6 °F (37 °C)  Heart Rate:  [] 84  Resp:  [18] 18  BP: (122-149)/(55-90) 149/89        Physical Exam:  Patient is alert and talkative in no distress at rest, lying still in bed   Neck is without mass or JVD  Heart is Reg wo murmur  Lungs are clear wo wheeze or crackle  Abd is soft without HSM or mass, not tender or distended, but full  MAEW  Skin is without rash  Neurologic exam in nonfocal   Mood is appropriate    Results Reviewed:  I have personally reviewed current lab, radiology, and data and agree.      Results from last 7 days  Lab Units 18  0657 18  0429 18  1047   WBC 10*3/mm3 10.04 9.92 11.51*   HEMOGLOBIN g/dL 13.7 13.8 14.8   HEMATOCRIT % 41.0 41.7 43.3   PLATELETS 10*3/mm3 252 246 301       Results from last 7 days  Lab Units 18  0657 18  0429 18  1047   SODIUM mmol/L 143 139 142   POTASSIUM mmol/L 3.7 4.1 4.2   CHLORIDE mmol/L 107 108 105   CO2 mmol/L 24.0 24.0 28.0   BUN mg/dL 8* 10 13   CREATININE mg/dL 0.57* 0.64 0.85   GLUCOSE mg/dL 89 94 100   CALCIUM mg/dL 8.0* 8.0* 9.1   ALT (SGPT) U/L 146* 245* 23   AST (SGOT) U/L 72* 205* 23   TROPONIN I ng/mL  --   --  <0.006     Estimated Creatinine  "Clearance: 106 mL/min (A) (by C-G formula based on SCr of 0.57 mg/dL (L)).  Lipase   Date Value Ref Range Status   08/06/2018 2,454 (H) 6 - 51 U/L Final   08/05/2018 2,603 (H) 6 - 51 U/L Final   08/04/2018 >3,500 (H) 6 - 51 U/L Final         Microbiology Results Abnormal     None          Imaging Results (last 24 hours)     ** No results found for the last 24 hours. **             I have reviewed the medications.    aspirin 81 mg Oral Daily   cetirizine 10 mg Oral Daily   docusate sodium 100 mg Oral BID   enoxaparin 40 mg Subcutaneous Q24H   fluticasone 2 spray Nasal Daily         Assessment/Plan   Assessment / Plan     Hospital Problem List     * (Principal)Acute pancreatitis without infection or necrosis    Leukocytosis    Breast cancer (CMS/HCC)             Brief Hospital Course to date:  Jackelyn Diaz is a 57 y.o. female several prior bouts of pancreatitis presents with epigastric pain and elevated lipase.      Assessment & Plan:    Acute Pancreatitis  - patient has experienced several similar episodes over the past 20 years and has been told she has \"idiopathic pancreatitis.\"  Prior history of cholecystectomy, denies alcohol use, no elevation in triglycerides.- Thinks it might have been the red cephalexin    RUQ US normal LFTs better- possibly passed a stone----    - continue IV fluids and supportive care  - start clear liquid diet today if no further nausea and patient feels like eating.  - will need further workup of these bouts of pancreatitis with Gastroenterology (IP vs OP), likely with an MRCP to rule out pancreas divisum or other pathology     Leukocytosis  - likely leukemoid reaction secondary to acute pancreatitis and/or recent steroids, resolved     Recent URI  - no current cough or fever, finished recent antibiotics/steroids     Recent Chest Pain  - episode 5 weeks ago while on vacation, left sided chest pain with arm numbness.  - no current chest pain, EKG sinus  - continue asa 81 mg daily  Needs " GXT    - has outpatient cardiology followup scheduled.    DVT Prophylaxis:  lovenox    CODE STATUS:   Code Status and Medical Interventions:   Ordered at: 08/04/18 1427     Level Of Support Discussed With:    Patient     Code Status:    CPR     Medical Interventions (Level of Support Prior to Arrest):    Full       Disposition: I expect the patient to be discharged home tomorrow      Electronically signed by Medina uDenas MD, 08/06/18, 6:31 PM.

## 2018-08-06 NOTE — PROGRESS NOTES
Discharge Planning Assessment  [facility]     Patient Name: Jackelyn Diaz  MRN: [id number]  Today's Date: 8/6/2018    Admit Date: 8/4/2018          Discharge Needs Assessment     Row Name 08/06/18 5059       Living Environment    Lives With spouse    Name(s) of Who Lives With Patient Darrian Diaz spouse [phone]    Current Living Arrangements home/apartment/condo    Primary Care Provided by self    Provides Primary Care For no one    Family Caregiver if Needed spouse    Quality of Family Relationships helpful;involved;supportive    Able to Return to Prior Arrangements yes       Resource/Environmental Concerns    Resource/Environmental Concerns none       Transition Planning    Patient/Family Anticipated Services at Transition none    Transportation Anticipated family or friend will provide       Discharge Needs Assessment    Readmission Within the Last 30 Days no previous admission in last 30 days    Concerns to be Addressed discharge planning    Equipment Currently Used at Home none    Anticipated Changes Related to Illness none    Equipment Needed After Discharge none    Offered/Gave Vendor List no            Discharge Plan     Row Name 08/06/18 8263       Plan    Plan Home with family    Patient/Family in Agreement with Plan yes    Plan Comments Spoke with patient and family at bedside.  Patient lives at home with .  Patient plans on discharging home when medically ready.  States that  will provide private transportation home from the hospital.  Patient denies using equipment.  She has adequete medication coverage.  Patient has no discharge needs at this time.  Case management will continue to follow.         Destination     No service coordination in this encounter.      Durable Medical Equipment     No service coordination in this encounter.      Dialysis/Infusion     No service coordination in this encounter.      Home Medical Care     No service coordination in this encounter.      Social  Care     No service coordination in this encounter.                Demographic Summary     Row Name 08/06/18 1558       General Information    Admission Type inpatient    Arrived From home    Referral Source admission list    Reason for Consult discharge planning    Preferred Language English     Used During This Interaction no    General Information Comments PCP Vikas Jimenes M.D.             Functional Status     Row Name 08/06/18 1559       Functional Status    Usual Activity Tolerance excellent    Current Activity Tolerance excellent       Functional Status, IADL    Medications independent    Meal Preparation independent    Housekeeping independent    Laundry independent    Shopping independent            Psychosocial    No documentation.           Abuse/Neglect    No documentation.           Legal    No documentation.           Substance Abuse    No documentation.           Patient Forms    No documentation.         Yasmin Santiago RN

## 2018-08-07 VITALS
RESPIRATION RATE: 16 BRPM | HEIGHT: 62 IN | TEMPERATURE: 98.2 F | WEIGHT: 166 LBS | SYSTOLIC BLOOD PRESSURE: 111 MMHG | HEART RATE: 79 BPM | OXYGEN SATURATION: 95 % | DIASTOLIC BLOOD PRESSURE: 69 MMHG | BODY MASS INDEX: 30.55 KG/M2

## 2018-08-07 PROBLEM — R07.2 PRECORDIAL PAIN: Status: ACTIVE | Noted: 2018-08-07

## 2018-08-07 LAB
ALBUMIN SERPL-MCNC: 3.68 G/DL (ref 3.2–4.8)
ALBUMIN/GLOB SERPL: 1.5 G/DL (ref 1.5–2.5)
ALP SERPL-CCNC: 107 U/L (ref 25–100)
ALT SERPL W P-5'-P-CCNC: 119 U/L (ref 7–40)
ANION GAP SERPL CALCULATED.3IONS-SCNC: 6 MMOL/L (ref 3–11)
AST SERPL-CCNC: 54 U/L (ref 0–33)
BILIRUB SERPL-MCNC: 0.4 MG/DL (ref 0.3–1.2)
BUN BLD-MCNC: 6 MG/DL (ref 9–23)
BUN/CREAT SERPL: 10.2 (ref 7–25)
CALCIUM SPEC-SCNC: 8.7 MG/DL (ref 8.7–10.4)
CHLORIDE SERPL-SCNC: 110 MMOL/L (ref 99–109)
CO2 SERPL-SCNC: 27 MMOL/L (ref 20–31)
CREAT BLD-MCNC: 0.59 MG/DL (ref 0.6–1.3)
GFR SERPL CREATININE-BSD FRML MDRD: 106 ML/MIN/1.73
GLOBULIN UR ELPH-MCNC: 2.4 GM/DL
GLUCOSE BLD-MCNC: 119 MG/DL (ref 70–100)
LIPASE SERPL-CCNC: 235 U/L (ref 6–51)
POTASSIUM BLD-SCNC: 3.6 MMOL/L (ref 3.5–5.5)
PROT SERPL-MCNC: 6.1 G/DL (ref 5.7–8.2)
SODIUM BLD-SCNC: 143 MMOL/L (ref 132–146)

## 2018-08-07 PROCEDURE — 99239 HOSP IP/OBS DSCHRG MGMT >30: CPT | Performed by: NURSE PRACTITIONER

## 2018-08-07 PROCEDURE — 83690 ASSAY OF LIPASE: CPT | Performed by: INTERNAL MEDICINE

## 2018-08-07 PROCEDURE — 80053 COMPREHEN METABOLIC PANEL: CPT | Performed by: INTERNAL MEDICINE

## 2018-08-07 RX ORDER — OXYCODONE HYDROCHLORIDE 5 MG/1
5 TABLET ORAL EVERY 6 HOURS PRN
Qty: 12 TABLET | Refills: 0 | Status: SHIPPED | OUTPATIENT
Start: 2018-08-07 | End: 2018-08-15

## 2018-08-07 NOTE — PROGRESS NOTES
Case Management Discharge Note    Final Note: Follow up appointment made with Cardiology outpatient    Destination     No service has been selected for the patient.      Durable Medical Equipment     No service has been selected for the patient.      Dialysis/Infusion     No service has been selected for the patient.      Home Medical Care     No service has been selected for the patient.      Social Care     No service has been selected for the patient.

## 2018-08-07 NOTE — DISCHARGE SUMMARY
"    [facility]  DISCHARGE SUMMARY    Patient Name: Jackelyn Diaz  : 1960  MRN: [id number]    Date of Admission: 2018  /Date of Discharge:  2018  Primary Care Physician: Provider, No Known    Consults     No orders found from 2018 to 2018.        Hospital Course     Presenting Problem:   Acute pancreatitis without infection or necrosis, unspecified pancreatitis type [K85.90]  Acute pancreatitis without infection or necrosis, unspecified pancreatitis type [K85.90]    Active Hospital Problems    Diagnosis Date Noted   • **Acute pancreatitis without infection or necrosis [K85.90] 2018   • Precordial pain [R07.2] 2018   • Leukocytosis [D72.829] 2018   • Breast cancer (CMS/HCC) [C50.919] 2018      Resolved Hospital Problems    Diagnosis Date Noted Date Resolved   No resolved problems to display.          Hospital Course:  Jackelyn Diaz is a 57 y.o. female with several prior bouts of pancreatitis presented to [facility] on 18 with epigastric pain and was found to have leukocytosis and elevated lipase of greater than 3500.  She was admitted for further treatment.  She was given IV fluids and supportive care. An ultrasound was performed and was normal.  Her LFTs and lipase improved as well as her symptoms.  Leukocytosis was likely leukemoid reaction.  She was placed on liquid diet and advanced to full liquids with good tolerance.  At this time, there is no clear etiology for her symptoms.  She did note that she recently took red cephalexin that she felt may be a factor as she can not tolerate red dye.  The patient reports that she has experienced these \"attacks\" several times over the course of 20 years.  Given this history, is it recommended that referral to GI be discussed in near future for further workup of these bouts.      Of note, The patient did report that she had episode of chest pain approximately 5 weeks prior to admission here. "  She has had no active chest pain during this hospitalization.  An EKG was performed, sinus.  It is recommended that she continue aspirin daily and follow up with cardiology for stress test.     The patient is clinically stable and appropriate for discharge home today.  Family will transport.  She will need to follow up with PCP and Cardiology as recommended.  She will be given a short course of pain medication upon discharge.       Discharge Follow Up Recommendations for labs/diagnostics:  Follow up with Cardiology.  Needs stress test.   Follow up with PCP.  Need to consider referral to GI for further workup of pancreatitis flares.       Day of Discharge     HPI:   Patient sitting up in bed. Denies any issues overall last night.  Reports she is tolerating full liquid diet without n/v.  Still with mild intermittent right sided upper abdominal discomfort but improving.  She is eager to go home.      Review of Systems  Gen- No fevers, chills  CV- No chest pain, palpitations  Resp- No cough, dyspnea  GI- no N/V, some mild right sided abd pain but improved       Otherwise ROS is negative except as mentioned in the HPI.    Vital Signs:   Temp:  [98 °F (36.7 °C)-98.2 °F (36.8 °C)] 98.2 °F (36.8 °C)  Heart Rate:  [] 79  Resp:  [16] 16  BP: (111-122)/(69-77) 111/69     Physical Exam:  Patient is alert and talkative in no distress at rest, lying still in bed   Neck is without mass or JVD  Heart is Reg wo murmur  Lungs are clear wo wheeze or crackle  Abd is soft without HSM or mass, not tender or distended  MAEW  Skin is without rash  Neurologic exam in nonfocal   Mood is appropriate    Pertinent  and/or Most Recent Results       Results from last 7 days  Lab Units 08/07/18  0429 08/06/18  0657 08/05/18  0429 08/04/18  1047   WBC 10*3/mm3  --  10.04 9.92 11.51*   HEMOGLOBIN g/dL  --  13.7 13.8 14.8   HEMATOCRIT %  --  41.0 41.7 43.3   PLATELETS 10*3/mm3  --  252 246 301   SODIUM mmol/L 143 143 139 142   POTASSIUM mmol/L  3.6 3.7 4.1 4.2   CHLORIDE mmol/L 110* 107 108 105   CO2 mmol/L 27.0 24.0 24.0 28.0   BUN mg/dL 6* 8* 10 13   CREATININE mg/dL 0.59* 0.57* 0.64 0.85   GLUCOSE mg/dL 119* 89 94 100   CALCIUM mg/dL 8.7 8.0* 8.0* 9.1       Results from last 7 days  Lab Units 08/07/18  0429 08/06/18  0657 08/05/18  0429 08/04/18  1047   BILIRUBIN mg/dL 0.4 0.5 0.6 0.4   ALK PHOS U/L 107* 118* 137* 80   ALT (SGPT) U/L 119* 146* 245* 23   AST (SGOT) U/L 54* 72* 205* 23       Results from last 7 days  Lab Units 08/04/18  1047   CHOLESTEROL mg/dL 197   TRIGLYCERIDES mg/dL 86   HDL CHOL mg/dL 67*       Results from last 7 days  Lab Units 08/04/18  1047   TROPONIN I ng/mL <0.006     Brief Urine Lab Results  (Last result in the past 365 days)      Color   Clarity   Blood   Leuk Est   Nitrite   Protein   CREAT   Urine HCG        08/04/18 1126 Yellow Turbid(A) Negative Negative Negative 30 mg/dL (1+)(A)               Microbiology Results Abnormal     None          Imaging Results (all)     Procedure Component Value Units Date/Time    US Abdomen Limited [[id number]] Collected:  08/05/18 1307     Updated:  08/07/18 0819    Narrative:       EXAMINATION: US ABDOMEN LIMITED - 8/5/2018     INDICATION:  K85.90-Acute pancreatitis without necrosis or infection,  unspecified.      TECHNIQUE: Ultrasound right upper quadrant abdomen.     COMPARISON: None.     FINDINGS:      Visualized portions of the pancreas within normal limits.  Liver demonstrates normal echogenicity and echotexture with the   exception of a subcentimeter hypoechoic to anechoic focus consistent  with hepatic cysts.  Gallbladder is surgically absent without abnormality of the gallbladder  fossa.  Common bile duct measures 9 mm in diameter at the level of the elizabeth  hepatis within normal limits.     Right kidney measures 9 cm in length without evidence of hydronephrosis,  contour-deforming mass or obvious calculi.       Impression:       No acute sonographic abnormality within the  visualized right  upper quadrant. Status post cholecystectomy.      DICTATED:   8/5/2018  EDITED/ls :   8/5/2018      This report was finalized on 8/7/2018 8:17 AM by Dr. Adeel Guillory.                            Discharge Details        Discharge Medications      New Medications      Instructions Start Date   oxyCODONE 5 MG immediate release tablet  Commonly known as:  ROXICODONE   5 mg, Oral, Every 6 Hours PRN         Continue These Medications      Instructions Start Date   aspirin 81 MG chewable tablet   81 mg, Oral, Daily      fluticasone 50 MCG/ACT nasal spray  Commonly known as:  FLONASE   2 sprays, Nasal, Daily      LORATADINE ALLERGY RELIEF PO   Oral             Discharge Disposition:  Home or Self Care    Discharge Diet:  Full liquids at this time.  Advance slowly as tolerated.  Recommend [place], soft diet over course of 1 week.     Discharge Activity:   Activity Instructions     Activity as Tolerated             Code Status/Level of Support:  Code Status and Medical Interventions:   Ordered at: 08/04/18 1427     Level Of Support Discussed With:    Patient     Code Status:    CPR     Medical Interventions (Level of Support Prior to Arrest):    Full         Additional Instructions for the Follow-ups that You Need to Schedule     Ambulatory Referral to [facility] [place] [facility]    As directed      Stress test ordered and needs follow up    Order Comments:  Stress test ordered and needs follow up     Service Requested:  Other (chest pain)         Discharge Follow-up with PCP    As directed      Currently Documented PCP:  Provider, No Known  PCP Phone Number:  None    Follow Up Details:  Follow up with PCP in 1-2 weeks.         Discharge Follow-up with Specified Provider: Keep appointment with Cardiology when scheduled.    As directed      To:  Keep appointment with Cardiology when scheduled.               Time Spent on Discharge:  35 minutes    Electronically signed by DUTCH Chavez,  08/07/18, 10:23 AM.

## 2018-08-07 NOTE — DISCHARGE INSTR - APPOINTMENTS
Please follow up with Dr. Jimenes on August 23 at 1:30pm. Please bring discharge summary to appointment.

## 2018-08-07 NOTE — PLAN OF CARE
Problem: Patient Care Overview  Goal: Plan of Care Review  Outcome: Ongoing (interventions implemented as appropriate)   08/07/18 0416   Coping/Psychosocial   Plan of Care Reviewed With patient;spouse   OTHER   Outcome Summary Pt had a great night. VSS on room air. No complaints of pain. Anticipate D/C home today.        Problem: Pancreatitis, Acute/Chronic (Adult)  Goal: Signs and Symptoms of Listed Potential Problems Will be Absent, Minimized or Managed (Pancreatitis, Acute/Chronic)  Outcome: Ongoing (interventions implemented as appropriate)      Problem: Pain, Acute (Adult)  Goal: Identify Related Risk Factors and Signs and Symptoms  Outcome: Ongoing (interventions implemented as appropriate)    Goal: Acceptable Pain Control/Comfort Level  Outcome: Ongoing (interventions implemented as appropriate)

## 2018-08-08 ENCOUNTER — READMISSION MANAGEMENT (OUTPATIENT)
Dept: CALL CENTER | Facility: HOSPITAL | Age: 58
End: 2018-08-08

## 2018-08-08 NOTE — OUTREACH NOTE
Prep Survey      Responses   Facility patient discharged from?  [place]   Is patient eligible?  Yes   Discharge diagnosis  Acute Pancreatitis   Does the patient have one of the following disease processes/diagnoses(primary or secondary)?  Other   Does the patient have Home health ordered?  No   Is there a DME ordered?  No   Prep survey completed?  Yes          Rex Olson RN

## 2018-08-09 ENCOUNTER — READMISSION MANAGEMENT (OUTPATIENT)
Dept: CALL CENTER | Facility: HOSPITAL | Age: 58
End: 2018-08-09

## 2018-08-09 NOTE — OUTREACH NOTE
Medical Week 1 Survey      Responses   Facility patient discharged from?  [place]   Does the patient have one of the following disease processes/diagnoses(primary or secondary)?  Other   Is there a successful TCM telephone encounter documented?  No   Week 1 attempt successful?  Yes   Call start time  1150   Call end time  1156   Discharge diagnosis  Acute Pancreatitis   Meds reviewed with patient/caregiver?  Yes   Is the patient having any side effects they believe may be caused by any medication additions or changes?  No   Does the patient have all medications ordered at discharge?  Yes   Is the patient taking all medications as directed (includes completed medication regime)?  Yes   Comments regarding appointments  Stess test scheduled for 08/15. Reviewed with patient.    Does the patient have a primary care provider?   Yes   Does the patient have an appointment with their PCP within 7 days of discharge?  Greater than 7 days   Comments regarding PCP  GISELA Jimenes- has followup scheduled for next week.    What is preventing the patient from scheduling follow up appointments within 7 days of discharge?  Earlier appointment not available   Nursing Interventions  Verified appointment date/time/provider, Educated patient on importance of making appointment, Advised patient to make appointment   Has the patient kept scheduled appointments due by today?  Yes   Psychosocial issues?  No   Comments  Patient reports is doing great. No further abdominal pain, no chest pain or SOB. Explained to patient if she develops vomiting, worsening pain, to seek medical help. She verbalized understanding.    Did the patient receive a copy of their discharge instructions?  Yes   Nursing interventions  Reviewed instructions with patient, Referred to HIM, Educated on MyChart   What is the patient's perception of their health status since discharge?  Improving   Is the patient/caregiver able to teach back signs and symptoms related to  disease process for when to call PCP?  Yes   Is the patient/caregiver able to teach back signs and symptoms related to disease process for when to call 911?  Yes   Is the patient/caregiver able to teach back the hierarchy of who to call/visit for symptoms/problems? PCP, Specialist, Home health nurse, Urgent Care, ED, 911  Yes   Week 1 call completed?  Yes          Yevgeniy Garcia RN

## 2018-08-15 ENCOUNTER — APPOINTMENT (OUTPATIENT)
Dept: CARDIOLOGY | Facility: HOSPITAL | Age: 58
End: 2018-08-15
Attending: INTERNAL MEDICINE

## 2018-08-20 ENCOUNTER — READMISSION MANAGEMENT (OUTPATIENT)
Dept: CALL CENTER | Facility: HOSPITAL | Age: 58
End: 2018-08-20

## 2018-08-20 NOTE — OUTREACH NOTE
Medical Week 2 Survey      Responses   Facility patient discharged from?  [place]   Does the patient have one of the following disease processes/diagnoses(primary or secondary)?  Other   Week 2 attempt successful?  Yes   Call start time  1223   Discharge diagnosis  Acute Pancreatitis   Call end time  1227   Meds reviewed with patient/caregiver?  Yes   Is the patient having any side effects they believe may be caused by any medication additions or changes?  No   Does the patient have all medications ordered at discharge?  Yes   Is the patient taking all medications as directed (includes completed medication regime)?  Yes   Medication comments  She did not require any pain medication.   Comments regarding appointments  Cancelled Stress test due to cost, waiting on Hospital bill to go thru.   Does the patient have a primary care provider?   Yes   Does the patient have an appointment with their PCP within 7 days of discharge?  Greater than 7 days   Comments regarding PCP  PCP appt is 08/21/18   What is preventing the patient from scheduling follow up appointments within 7 days of discharge?  Earlier appointment not available   Has the patient kept scheduled appointments due by today?  N/A   Has home health visited the patient within 72 hours of discharge?  N/A   Psychosocial issues?  No   Comments  She states she has not had any pain.  She will contact the Clinical Dietician at [facility] to discuss fat grams/day.   Did the patient receive a copy of their discharge instructions?  Yes   Nursing interventions  Reviewed instructions with patient   What is the patient's perception of their health status since discharge?  Improving   Is the patient/caregiver able to teach back signs and symptoms related to disease process for when to call PCP?  Yes   Is the patient/caregiver able to teach back signs and symptoms related to disease process for when to call 911?  Yes   Is the patient/caregiver able to teach back the hierarchy of  who to call/visit for symptoms/problems? PCP, Specialist, Home health nurse, Urgent Care, ED, 911  Yes   Week 2 Call Completed?  Yes          Christine Humphries RN

## 2018-08-29 ENCOUNTER — READMISSION MANAGEMENT (OUTPATIENT)
Dept: CALL CENTER | Facility: HOSPITAL | Age: 58
End: 2018-08-29

## 2018-08-29 NOTE — OUTREACH NOTE
Medical Week 3 Survey      Responses   Facility patient discharged from?  [place]   Does the patient have one of the following disease processes/diagnoses(primary or secondary)?  Other   Week 3 attempt successful?  Yes   Call start time  1607   Call end time  1619   Discharge diagnosis  Acute Pancreatitis   Meds reviewed with patient/caregiver?  Yes   Is the patient having any side effects they believe may be caused by any medication additions or changes?  No   Does the patient have all medications ordered at discharge?  N/A   Is the patient taking all medications as directed (includes completed medication regime)?  Yes   Does the patient have a primary care provider?   Yes   Does the patient have an appointment with their PCP within 7 days of discharge?  Greater than 7 days   What is preventing the patient from scheduling follow up appointments within 7 days of discharge?  Earlier appointment not available   Nursing Interventions  Verified appointment date/time/provider   Has the patient kept scheduled appointments due by today?  Yes   Comments  pt was instructed by MD, to reduce fat in diet, pt has diligently adhered to new diet, so far has lost 8 lbs in 2 weeks, has been studying low fat diet, and spoken to dietician, will have labs redrawn in 2 more weeks to check  progress   Did the patient receive a copy of their discharge instructions?  Yes   Nursing interventions  Reviewed instructions with patient   What is the patient's perception of their health status since discharge?  Improving   Is the patient/caregiver able to teach back signs and symptoms related to disease process for when to call PCP?  Yes   Is the patient/caregiver able to teach back signs and symptoms related to disease process for when to call 911?  Yes   Is the patient/caregiver able to teach back the hierarchy of who to call/visit for symptoms/problems? PCP, Specialist, Home health nurse, Urgent Care, ED, 911  Yes   Additional teach back  comments  low Na, low fat and decreased sugars diet discussed, pt states great amount of newly learned info on subject   Week 3 Call Completed?  Yes          Keysha Hung RN

## 2018-09-06 ENCOUNTER — READMISSION MANAGEMENT (OUTPATIENT)
Dept: CALL CENTER | Facility: HOSPITAL | Age: 58
End: 2018-09-06

## 2018-09-06 NOTE — OUTREACH NOTE
Medical Week 4 Survey      Responses   Facility patient discharged from?  [place]   Does the patient have one of the following disease processes/diagnoses(primary or secondary)?  Other   Week 4 attempt successful?  Yes   Call start time  1131   Call end time  1134   Discharge diagnosis  Acute Pancreatitis   Meds reviewed with patient/caregiver?  Yes   Is the patient having any side effects they believe may be caused by any medication additions or changes?  No   Is the patient taking all medications as directed (includes completed medication regime)?  Yes   Has the patient kept scheduled appointments due by today?  Yes   Is the patient still receiving Home Health Services?  N/A   Psychosocial issues?  No   Comments  She is down 9 pounds now and feels she is at another plateau.  She has not had any abd pain.   What is the patient's perception of their health status since discharge?  Improving   Is the patient/caregiver able to teach back signs and symptoms related to disease process for when to call PCP?  Yes   Is the patient/caregiver able to teach back signs and symptoms related to disease process for when to call 911?  Yes   Is the patient/caregiver able to teach back the hierarchy of who to call/visit for symptoms/problems? PCP, Specialist, Home health nurse, Urgent Care, ED, 911  Yes   Week 4 Call Completed?  Yes   Would the patient like one additional call?  No   Graduated  Yes   Did the patient feel the follow up calls were helpful during their recovery period?  Yes   Was the number of calls appropriate?  Yes          Christine Humphries RN

## 2019-03-05 ENCOUNTER — TRANSCRIBE ORDERS (OUTPATIENT)
Dept: ADMINISTRATIVE | Facility: HOSPITAL | Age: 59
End: 2019-03-05

## 2019-03-05 DIAGNOSIS — Z12.31 VISIT FOR SCREENING MAMMOGRAM: Primary | ICD-10-CM

## 2019-05-30 ENCOUNTER — HOSPITAL ENCOUNTER (OUTPATIENT)
Dept: MAMMOGRAPHY | Facility: HOSPITAL | Age: 59
Discharge: HOME | End: 2019-05-30
Admitting: OBSTETRICS & GYNECOLOGY

## 2019-05-30 DIAGNOSIS — Z12.31 VISIT FOR SCREENING MAMMOGRAM: ICD-10-CM

## 2019-05-30 PROCEDURE — 77063 BREAST TOMOSYNTHESIS BI: CPT | Performed by: RADIOLOGY

## 2019-05-30 PROCEDURE — 77067 SCR MAMMO BI INCL CAD: CPT | Performed by: RADIOLOGY

## 2019-05-30 PROCEDURE — 77063 BREAST TOMOSYNTHESIS BI: CPT

## 2019-05-30 PROCEDURE — 77067 SCR MAMMO BI INCL CAD: CPT

## 2019-06-18 ENCOUNTER — HOSPITAL ENCOUNTER (OUTPATIENT)
Dept: MAMMOGRAPHY | Facility: HOSPITAL | Age: 59
Discharge: HOME | End: 2019-06-18
Admitting: RADIOLOGY

## 2019-06-18 DIAGNOSIS — R92.8 ABNORMAL MAMMOGRAM: ICD-10-CM

## 2019-06-18 PROCEDURE — 77065 DX MAMMO INCL CAD UNI: CPT | Performed by: RADIOLOGY

## 2019-06-18 PROCEDURE — 77065 DX MAMMO INCL CAD UNI: CPT

## 2020-07-20 ENCOUNTER — TRANSCRIBE ORDERS (OUTPATIENT)
Dept: ADMINISTRATIVE | Facility: HOSPITAL | Age: 60
End: 2020-07-20

## 2020-07-20 DIAGNOSIS — Z12.31 VISIT FOR SCREENING MAMMOGRAM: Primary | ICD-10-CM

## 2020-10-14 ENCOUNTER — HOSPITAL ENCOUNTER (OUTPATIENT)
Dept: MAMMOGRAPHY | Facility: HOSPITAL | Age: 60
Discharge: HOME | End: 2020-10-14
Admitting: OBSTETRICS & GYNECOLOGY

## 2020-10-14 DIAGNOSIS — Z12.31 VISIT FOR SCREENING MAMMOGRAM: ICD-10-CM

## 2020-10-14 PROCEDURE — 77067 SCR MAMMO BI INCL CAD: CPT | Performed by: RADIOLOGY

## 2020-10-14 PROCEDURE — 77063 BREAST TOMOSYNTHESIS BI: CPT | Performed by: RADIOLOGY

## 2020-10-14 PROCEDURE — 77067 SCR MAMMO BI INCL CAD: CPT

## 2020-10-14 PROCEDURE — 77063 BREAST TOMOSYNTHESIS BI: CPT

## 2021-01-07 ENCOUNTER — OFFICE VISIT (OUTPATIENT)
Dept: OBSTETRICS AND GYNECOLOGY | Facility: CLINIC | Age: 61
End: 2021-01-07

## 2021-01-07 VITALS
BODY MASS INDEX: 27.31 KG/M2 | WEIGHT: 160 LBS | DIASTOLIC BLOOD PRESSURE: 84 MMHG | SYSTOLIC BLOOD PRESSURE: 140 MMHG | HEIGHT: 64 IN

## 2021-01-07 DIAGNOSIS — Z80.0 FAMILY HISTORY OF COLON CANCER: ICD-10-CM

## 2021-01-07 DIAGNOSIS — C50.919 MALIGNANT NEOPLASM OF FEMALE BREAST, UNSPECIFIED ESTROGEN RECEPTOR STATUS, UNSPECIFIED LATERALITY, UNSPECIFIED SITE OF BREAST (HCC): ICD-10-CM

## 2021-01-07 DIAGNOSIS — Z01.419 WOMEN'S ANNUAL ROUTINE GYNECOLOGICAL EXAMINATION: Primary | ICD-10-CM

## 2021-01-07 DIAGNOSIS — Z12.31 BREAST CANCER SCREENING BY MAMMOGRAM: ICD-10-CM

## 2021-01-07 DIAGNOSIS — Z90.710 HISTORY OF HYSTERECTOMY: ICD-10-CM

## 2021-01-07 PROBLEM — Z86.718 HISTORY OF DVT (DEEP VEIN THROMBOSIS): Status: ACTIVE | Noted: 2021-01-07

## 2021-01-07 PROCEDURE — 99396 PREV VISIT EST AGE 40-64: CPT | Performed by: OBSTETRICS & GYNECOLOGY

## 2021-01-07 RX ORDER — CLOCORTOLONE PIVALATE 0.1 %
CREAM (GRAM) TOPICAL
COMMUNITY
Start: 2020-10-28

## 2021-01-07 RX ORDER — CLOBETASOL PROPIONATE 0.05 G/ML
SPRAY TOPICAL
COMMUNITY
Start: 2020-11-03

## 2021-01-07 NOTE — PROGRESS NOTES
GYN Annual Exam     CC - Here for annual exam.        HPI  Jackelyn Diaz is a 60 y.o. female, , who presents for annual well woman exam.  She is postmenopausal.  Patient denies vaginal bleeding.  Patient reports problems with: none. There were no changes to her medical or surgical history since her last visit.. Partner Status: Marital Status: .  New Partners since last visit: no.      Additional OB/GYN History   Current contraception: contraceptive methods: Post menopausal status    On HRT? No  Last Pap :   Last Completed Pap Smear       Status Date      PAP SMEAR Done 2016 negative        History of abnormal Pap smear: no  Family history of uterine, colon, breast, or ovarian cancer: yes - colon cancer in her brother and maternal grandfather. The patient had breast cancer in , she had a lumpectomy and radiation  Performs monthly Self-Breast Exam: no  Last mammogram:   Last Completed Mammogram       Status Date      MAMMOGRAM Done 10/14/2020 MAMMO SCREENING DIGITAL TOMOSYNTHESIS BILATERAL W CAD     Patient has more history with this topic...        Last colonoscopy:   Last Completed Colonoscopy       Status Date      COLONOSCOPY Done 2015         Last DEXA: None  Exercises Regularly: yes  Feelings of Anxiety or Depression: no      Tobacco Usage?: No   OB History        2    Para   2    Term   2            AB        Living           SAB        TAB        Ectopic        Molar        Multiple        Live Births                    Health Maintenance   Topic Date Due   • Annual Gynecologic Pelvic and Breast Exam  1960   • ANNUAL PHYSICAL  1965   • TDAP/TD VACCINES (1 - Tdap) 1981   • ZOSTER VACCINE (1 of 2) 2012   • HEPATITIS C SCREENING  2018   • PAP SMEAR  2019   • COLONOSCOPY  2020   • INFLUENZA VACCINE  2020   • MAMMOGRAM  10/14/2021   • Pneumococcal Vaccine 0-64  Aged Out   • MENINGOCOCCAL VACCINE  Aged Out       The additional  "following portions of the patient's history were reviewed and updated as appropriate: allergies, current medications, past family history, past medical history, past social history, past surgical history and problem list.    Review of Systems   Constitutional: Negative.    HENT: Negative.    Eyes: Negative.    Respiratory: Negative.    Cardiovascular: Negative.    Gastrointestinal: Negative.    Endocrine: Negative.    Genitourinary: Negative.    Musculoskeletal: Negative.    Skin: Negative.    Allergic/Immunologic: Negative.    Neurological: Negative.    Hematological: Negative.    Psychiatric/Behavioral: Negative.      All other systems reviewed and are negative.     I have reviewed and agree with the HPI, ROS, and historical information as entered above. Rhonda Dan MD    Objective   /84   Ht 162.6 cm (64\")   Wt 72.6 kg (160 lb)   BMI 27.46 kg/m²     Physical Exam  Vitals signs and nursing note reviewed. Exam conducted with a chaperone present.   Constitutional:       Appearance: She is well-developed.   HENT:      Head: Normocephalic and atraumatic.   Neck:      Musculoskeletal: Normal range of motion. No muscular tenderness.      Thyroid: No thyroid mass or thyromegaly.   Cardiovascular:      Rate and Rhythm: Normal rate and regular rhythm.      Heart sounds: No murmur.   Pulmonary:      Effort: Pulmonary effort is normal. No retractions.      Breath sounds: Normal breath sounds. No wheezing, rhonchi or rales.   Chest:      Chest wall: No mass or tenderness.      Breasts:         Right: Normal. No mass, nipple discharge, skin change or tenderness.         Left: Normal. No mass, nipple discharge, skin change or tenderness.   Abdominal:      General: Bowel sounds are normal.      Palpations: Abdomen is soft. Abdomen is not rigid. There is no mass.      Tenderness: There is no abdominal tenderness. There is no guarding.      Hernia: No hernia is present. There is no hernia in the left inguinal " area or right inguinal area.   Genitourinary:     General: Normal vulva.      Exam position: Lithotomy position.      Pubic Area: No rash.       Labia:         Right: No rash, tenderness or lesion.         Left: No rash, tenderness or lesion.       Urethra: No urethral pain or urethral swelling.      Vagina: Normal. No vaginal discharge or lesions.      Uterus: Absent.       Adnexa:         Right: No mass, tenderness or fullness.          Left: No mass, tenderness or fullness.        Rectum: No external hemorrhoid.      Comments: Cervix surgically absent.  Vaginal cuff intact.  Neurological:      Mental Status: She is alert and oriented to person, place, and time.   Psychiatric:         Behavior: Behavior normal.            Assessment and Plan    Problem List Items Addressed This Visit        Other    Breast cancer (CMS/HCC)    Overview     Diagnosed in 2012.  Treated with radiation and lumpectomy.  She has been cancer free since that time.  BRCA negative.  She completed 5 years of tamoxifen         Family history of colon cancer    Overview     Diagnosed in patient's brother at age 62.  Also noted in patient's maternal grandfather         History of hysterectomy    Overview     TLH/BSO in 2012 for prolapse           Other Visit Diagnoses     Women's annual routine gynecological examination    -  Primary    Relevant Orders    Pap IG, Rfx HPV ASCU    Breast cancer screening by mammogram              1. GYN annual well woman exam.   2. Reviewed monthly self breast exams.  Instructed to call with lumps, pain, or breast discharge.  Yearly mammograms ordered.  3. Ordered mammogram today.  4. Recommended use of Vitamin D and getting adequate calcium in her diet. (1500mg)  5. Reviewed exercise as a preventative health measures.   6. Reviewed BMI and weight loss as preventative health measures.   7. Reccommended Flu Vaccine in Fall of each year.  8. RTC in 1 year or PRN with problems.  9. Other: Advised flu vaccine and  Covid vaccine when available to her    Rhonda Dan MD  01/07/2021

## 2021-01-13 DIAGNOSIS — Z01.419 WOMEN'S ANNUAL ROUTINE GYNECOLOGICAL EXAMINATION: ICD-10-CM

## 2021-10-15 ENCOUNTER — HOSPITAL ENCOUNTER (OUTPATIENT)
Dept: MAMMOGRAPHY | Facility: HOSPITAL | Age: 61
Discharge: HOME | End: 2021-10-15
Admitting: OBSTETRICS & GYNECOLOGY

## 2021-10-15 DIAGNOSIS — Z12.31 BREAST CANCER SCREENING BY MAMMOGRAM: ICD-10-CM

## 2021-10-15 PROCEDURE — 77063 BREAST TOMOSYNTHESIS BI: CPT | Performed by: RADIOLOGY

## 2021-10-15 PROCEDURE — 77067 SCR MAMMO BI INCL CAD: CPT | Performed by: RADIOLOGY

## 2021-10-15 PROCEDURE — 77067 SCR MAMMO BI INCL CAD: CPT

## 2021-10-15 PROCEDURE — 77063 BREAST TOMOSYNTHESIS BI: CPT

## 2022-01-10 ENCOUNTER — OFFICE VISIT (OUTPATIENT)
Dept: OBSTETRICS AND GYNECOLOGY | Facility: CLINIC | Age: 62
End: 2022-01-10

## 2022-01-10 VITALS
BODY MASS INDEX: 27.49 KG/M2 | SYSTOLIC BLOOD PRESSURE: 142 MMHG | HEIGHT: 64 IN | WEIGHT: 161 LBS | DIASTOLIC BLOOD PRESSURE: 88 MMHG

## 2022-01-10 DIAGNOSIS — Z01.419 WOMEN'S ANNUAL ROUTINE GYNECOLOGICAL EXAMINATION: Primary | ICD-10-CM

## 2022-01-10 PROCEDURE — 99396 PREV VISIT EST AGE 40-64: CPT | Performed by: NURSE PRACTITIONER

## 2022-01-10 NOTE — PROGRESS NOTES
GYN Annual Exam     CC - Here for annual exam.        BUBBA  Jackelyn Diaz is a 61 y.o. female, , who presents for annual well woman exam.  She is postmenopausal.  Patient denies vaginal bleeding. ..  Patient reports problems with: none. There were no changes to her medical or surgical history since her last visit.. Partner Status: Marital Status: .  New Partners since last visit: no. Patient has a personal h/o breast cane in . She had a left breast lumpectomy and radiation. Last mammogram was 10/15/21 and was normal.   Additional OB/GYN History   Current contraception: contraceptive methods: Hysterectomy  Desires to: do not start contraception  On HRT? No  Last Pap :   Last Completed Pap Smear          Ordered - PAP SMEAR (Every 3 Years) Ordered on 1/10/2022    2021  Pap IG, Rfx HPV ASCU    2016  Done - negative              History of abnormal Pap smear: no  Family history of uterine, colon, breast, or ovarian cancer: no  Performs monthly Self-Breast Exam: yes  Last mammogram: 10/15/2021. Done at .    Last Completed Mammogram          MAMMOGRAM (Yearly) Next due on 10/15/2022    10/15/2021  Mammo Screening Digital Tomosynthesis Bilateral With CAD    10/14/2020  Mammo Screening Digital Tomosynthesis Bilateral With CAD    2019  Mammo Diagnostic Right With CAD    2019  Mammo Screening Digital Tomosynthesis Bilateral With CAD    2018  Mammo screening digital tomosynthesis bilateral w CAD    Only the first 5 history entries have been loaded, but more history exists.              Last colonoscopy: in  and was normal  Last Completed Colonoscopy     This patient has no relevant Health Maintenance data.        Last DEXA: None  Exercises Regularly: yes  Feelings of Anxiety or Depression: no      Tobacco Usage?: No   OB History        2    Para   2    Term   2            AB        Living           SAB        IAB        Ectopic        Molar        Multiple  "       Live Births                    Health Maintenance   Topic Date Due   • ANNUAL PHYSICAL  Never done   • COVID-19 Vaccine (1) Never done   • TDAP/TD VACCINES (2 - Tdap) 09/24/2009   • ZOSTER VACCINE (1 of 2) Never done   • HEPATITIS C SCREENING  Never done   • COLORECTAL CANCER SCREENING  01/01/2020   • INFLUENZA VACCINE  Never done   • Annual Gynecologic Pelvic and Breast Exam  01/08/2022   • MAMMOGRAM  10/15/2022   • PAP SMEAR  01/07/2024   • Pneumococcal Vaccine 0-64  Aged Out       The additional following portions of the patient's history were reviewed and updated as appropriate: allergies, current medications, past family history, past medical history, past social history, past surgical history and problem list.    Review of Systems   Constitutional: Negative.    Cardiovascular: Negative.    Gastrointestinal: Negative.    Genitourinary: Negative.    Psychiatric/Behavioral: Negative.        I have reviewed and agree with the HPI, ROS, and historical information as entered above. Dang Montaño, APRN    Objective   /88   Ht 162.6 cm (64\")   Wt 73 kg (161 lb)   BMI 27.64 kg/m²     Physical Exam  Vitals and nursing note reviewed. Exam conducted with a chaperone present.   Constitutional:       Appearance: Normal appearance. She is well-developed and normal weight.   HENT:      Head: Normocephalic and atraumatic.   Neck:      Thyroid: No thyroid mass or thyromegaly.   Cardiovascular:      Rate and Rhythm: Normal rate and regular rhythm.      Heart sounds: No murmur heard.      Pulmonary:      Effort: Pulmonary effort is normal. No retractions.      Breath sounds: Normal breath sounds. No wheezing, rhonchi or rales.   Chest:      Chest wall: No mass or tenderness.   Breasts:      Right: Normal. No mass, nipple discharge, skin change or tenderness.      Left: Normal. No mass, nipple discharge, skin change or tenderness.       Abdominal:      General: Bowel sounds are normal.      Palpations: Abdomen is " soft. Abdomen is not rigid. There is no mass.      Tenderness: There is no abdominal tenderness. There is no guarding.      Hernia: No hernia is present.   Genitourinary:     General: Normal vulva.      Labia:         Right: No rash, tenderness or lesion.         Left: No rash, tenderness or lesion.       Vagina: Normal. No vaginal discharge or lesions.      Uterus: Absent.       Rectum: Normal. No external hemorrhoid.   Musculoskeletal:      Cervical back: Normal range of motion. No muscular tenderness.   Neurological:      Mental Status: She is alert and oriented to person, place, and time.   Psychiatric:         Behavior: Behavior normal.            Assessment and Plan    Problem List Items Addressed This Visit     None      Visit Diagnoses     Women's annual routine gynecological examination    -  Primary    Relevant Orders    Pap IG, HPV-hr          1. GYN annual well woman exam  2. Colonoscopy normal in 1/2021  3. Personal h/o breast cancer. Next mammogram due in 10/2022.   4. Reviewed monthly self breast exams.  Instructed to call with lumps, pain, or breast discharge.  Yearly mammograms ordered.  5. Recommended use of Vitamin D and getting adequate calcium in her diet. (1500mg)  6. Reviewed exercise as a preventative health measures.   7. RTC in 1 year or PRN with problems.      Dang Montaño, APRN  01/10/2022

## 2022-01-14 DIAGNOSIS — Z01.419 WOMEN'S ANNUAL ROUTINE GYNECOLOGICAL EXAMINATION: ICD-10-CM

## 2022-08-02 ENCOUNTER — TRANSCRIBE ORDERS (OUTPATIENT)
Dept: ADMINISTRATIVE | Facility: HOSPITAL | Age: 62
End: 2022-08-02

## 2022-08-02 ENCOUNTER — HOSPITAL ENCOUNTER (OUTPATIENT)
Dept: CT IMAGING | Facility: HOSPITAL | Age: 62
Discharge: HOME | End: 2022-08-02
Admitting: PHYSICIAN ASSISTANT

## 2022-08-02 DIAGNOSIS — R79.89 ELEVATED D-DIMER: ICD-10-CM

## 2022-08-02 DIAGNOSIS — R79.89 ELEVATED D-DIMER: Primary | ICD-10-CM

## 2022-08-02 PROCEDURE — 71275 CT ANGIOGRAPHY CHEST: CPT

## 2022-08-02 PROCEDURE — 0 IOPAMIDOL PER 1 ML: Performed by: PHYSICIAN ASSISTANT

## 2022-08-02 RX ADMIN — IOPAMIDOL 95 ML: 755 INJECTION, SOLUTION INTRAVENOUS at 13:12

## 2022-08-08 ENCOUNTER — HOSPITAL ENCOUNTER (EMERGENCY)
Facility: HOSPITAL | Age: 62
Discharge: HOME | End: 2022-08-08
Attending: EMERGENCY MEDICINE | Admitting: EMERGENCY MEDICINE

## 2022-08-08 ENCOUNTER — APPOINTMENT (OUTPATIENT)
Dept: GENERAL RADIOLOGY | Facility: HOSPITAL | Age: 62
End: 2022-08-08

## 2022-08-08 VITALS
RESPIRATION RATE: 16 BRPM | TEMPERATURE: 98.2 F | DIASTOLIC BLOOD PRESSURE: 74 MMHG | SYSTOLIC BLOOD PRESSURE: 118 MMHG | BODY MASS INDEX: 27.46 KG/M2 | HEIGHT: 63 IN | OXYGEN SATURATION: 98 % | WEIGHT: 155 LBS | HEART RATE: 105 BPM

## 2022-08-08 DIAGNOSIS — E86.9 VOLUME DEPLETION: Primary | ICD-10-CM

## 2022-08-08 DIAGNOSIS — B27.90 INFECTIOUS MONONUCLEOSIS WITHOUT COMPLICATION, INFECTIOUS MONONUCLEOSIS DUE TO UNSPECIFIED ORGANISM: ICD-10-CM

## 2022-08-08 LAB
ALBUMIN SERPL-MCNC: 2.8 G/DL (ref 3.5–5.2)
ALBUMIN/GLOB SERPL: 0.8 G/DL
ALP SERPL-CCNC: 91 U/L (ref 39–117)
ALT SERPL W P-5'-P-CCNC: 27 U/L (ref 1–33)
ANION GAP SERPL CALCULATED.3IONS-SCNC: 10 MMOL/L (ref 5–15)
ANISOCYTOSIS BLD QL: ABNORMAL
AST SERPL-CCNC: 45 U/L (ref 1–32)
BACTERIA UR QL AUTO: ABNORMAL /HPF
BASOPHILS # BLD MANUAL: 0 10*3/MM3 (ref 0–0.2)
BASOPHILS NFR BLD MANUAL: 0 % (ref 0–1.5)
BILIRUB SERPL-MCNC: 0.4 MG/DL (ref 0–1.2)
BILIRUB UR QL STRIP: NEGATIVE
BUN SERPL-MCNC: 10 MG/DL (ref 6–20)
BUN/CREAT SERPL: 10.2 (ref 7–25)
CALCIUM SPEC-SCNC: 8.1 MG/DL (ref 8.6–10.5)
CHLORIDE SERPL-SCNC: 96 MMOL/L (ref 98–107)
CLARITY UR: CLEAR
CLUMPED PLATELETS: PRESENT
CO2 SERPL-SCNC: 26 MMOL/L (ref 22–29)
COLOR UR: YELLOW
CREAT SERPL-MCNC: 0.98 MG/DL (ref 0.57–1)
D-LACTATE SERPL-SCNC: 1.7 MMOL/L (ref 0.5–2)
DEPRECATED RDW RBC AUTO: 44 FL (ref 37–54)
EGFRCR SERPLBLD CKD-EPI 2021: 66.6 ML/MIN/1.73
EOSINOPHIL # BLD MANUAL: 0 10*3/MM3 (ref 0–0.4)
EOSINOPHIL NFR BLD MANUAL: 0 % (ref 0.3–6.2)
ERYTHROCYTE [DISTWIDTH] IN BLOOD BY AUTOMATED COUNT: 13.3 % (ref 12.3–15.4)
FLUAV RNA RESP QL NAA+PROBE: NOT DETECTED
FLUBV RNA RESP QL NAA+PROBE: NOT DETECTED
GLOBULIN UR ELPH-MCNC: 3.6 GM/DL
GLUCOSE SERPL-MCNC: 131 MG/DL (ref 65–99)
GLUCOSE UR STRIP-MCNC: NEGATIVE MG/DL
HCT VFR BLD AUTO: 40 % (ref 34–46.6)
HETEROPH AB SER QL LA: POSITIVE
HGB BLD-MCNC: 13.3 G/DL (ref 12–15.9)
HGB UR QL STRIP.AUTO: NEGATIVE
HOLD SPECIMEN: NORMAL
HYALINE CASTS UR QL AUTO: ABNORMAL /LPF
KETONES UR QL STRIP: NEGATIVE
LEUKOCYTE ESTERASE UR QL STRIP.AUTO: ABNORMAL
LYMPHOCYTES # BLD MANUAL: 2.32 10*3/MM3 (ref 0.7–3.1)
LYMPHOCYTES NFR BLD MANUAL: 8 % (ref 5–12)
MAGNESIUM SERPL-MCNC: 2.1 MG/DL (ref 1.6–2.6)
MCH RBC QN AUTO: 30.2 PG (ref 26.6–33)
MCHC RBC AUTO-ENTMCNC: 33.3 G/DL (ref 31.5–35.7)
MCV RBC AUTO: 90.7 FL (ref 79–97)
MONOCYTES # BLD: 0.69 10*3/MM3 (ref 0.1–0.9)
NEUTROPHILS # BLD AUTO: 5.59 10*3/MM3 (ref 1.7–7)
NEUTROPHILS NFR BLD MANUAL: 38 % (ref 42.7–76)
NEUTS BAND NFR BLD MANUAL: 27 % (ref 0–5)
NITRITE UR QL STRIP: NEGATIVE
PH UR STRIP.AUTO: 5.5 [PH] (ref 5–8)
PLATELET # BLD AUTO: 193 10*3/MM3 (ref 140–450)
PMV BLD AUTO: 9.3 FL (ref 6–12)
POTASSIUM SERPL-SCNC: 3.9 MMOL/L (ref 3.5–5.2)
PROT SERPL-MCNC: 6.4 G/DL (ref 6–8.5)
PROT UR QL STRIP: ABNORMAL
QT INTERVAL: 298 MS
QTC INTERVAL: 419 MS
RBC # BLD AUTO: 4.41 10*6/MM3 (ref 3.77–5.28)
RBC # UR STRIP: ABNORMAL /HPF
REF LAB TEST METHOD: ABNORMAL
SARS-COV-2 RNA RESP QL NAA+PROBE: NOT DETECTED
SMALL PLATELETS BLD QL SMEAR: ADEQUATE
SODIUM SERPL-SCNC: 132 MMOL/L (ref 136–145)
SP GR UR STRIP: 1.02 (ref 1–1.03)
SQUAMOUS #/AREA URNS HPF: ABNORMAL /HPF
TROPONIN T SERPL-MCNC: <0.01 NG/ML (ref 0–0.03)
UROBILINOGEN UR QL STRIP: ABNORMAL
VARIANT LYMPHS NFR BLD MANUAL: 27 % (ref 19.6–45.3)
WBC # UR STRIP: ABNORMAL /HPF
WBC MORPH BLD: NORMAL
WBC NRBC COR # BLD: 8.6 10*3/MM3 (ref 3.4–10.8)
WHOLE BLOOD HOLD COAG: NORMAL
WHOLE BLOOD HOLD SPECIMEN: NORMAL

## 2022-08-08 PROCEDURE — 96374 THER/PROPH/DIAG INJ IV PUSH: CPT

## 2022-08-08 PROCEDURE — 85007 BL SMEAR W/DIFF WBC COUNT: CPT | Performed by: EMERGENCY MEDICINE

## 2022-08-08 PROCEDURE — 93005 ELECTROCARDIOGRAM TRACING: CPT

## 2022-08-08 PROCEDURE — 84484 ASSAY OF TROPONIN QUANT: CPT | Performed by: EMERGENCY MEDICINE

## 2022-08-08 PROCEDURE — 83605 ASSAY OF LACTIC ACID: CPT | Performed by: PHYSICIAN ASSISTANT

## 2022-08-08 PROCEDURE — 87798 DETECT AGENT NOS DNA AMP: CPT | Performed by: PHYSICIAN ASSISTANT

## 2022-08-08 PROCEDURE — 36415 COLL VENOUS BLD VENIPUNCTURE: CPT

## 2022-08-08 PROCEDURE — 87040 BLOOD CULTURE FOR BACTERIA: CPT | Performed by: PHYSICIAN ASSISTANT

## 2022-08-08 PROCEDURE — 99284 EMERGENCY DEPT VISIT MOD MDM: CPT

## 2022-08-08 PROCEDURE — 25010000002 ONDANSETRON PER 1 MG: Performed by: PHYSICIAN ASSISTANT

## 2022-08-08 PROCEDURE — 86308 HETEROPHILE ANTIBODY SCREEN: CPT | Performed by: PHYSICIAN ASSISTANT

## 2022-08-08 PROCEDURE — 83735 ASSAY OF MAGNESIUM: CPT | Performed by: EMERGENCY MEDICINE

## 2022-08-08 PROCEDURE — 87636 SARSCOV2 & INF A&B AMP PRB: CPT | Performed by: PHYSICIAN ASSISTANT

## 2022-08-08 PROCEDURE — 71045 X-RAY EXAM CHEST 1 VIEW: CPT

## 2022-08-08 PROCEDURE — 81001 URINALYSIS AUTO W/SCOPE: CPT | Performed by: EMERGENCY MEDICINE

## 2022-08-08 PROCEDURE — 93005 ELECTROCARDIOGRAM TRACING: CPT | Performed by: EMERGENCY MEDICINE

## 2022-08-08 PROCEDURE — 80053 COMPREHEN METABOLIC PANEL: CPT | Performed by: EMERGENCY MEDICINE

## 2022-08-08 PROCEDURE — 85025 COMPLETE CBC W/AUTO DIFF WBC: CPT | Performed by: EMERGENCY MEDICINE

## 2022-08-08 RX ORDER — ONDANSETRON 2 MG/ML
4 INJECTION INTRAMUSCULAR; INTRAVENOUS ONCE
Status: COMPLETED | OUTPATIENT
Start: 2022-08-08 | End: 2022-08-08

## 2022-08-08 RX ORDER — SODIUM CHLORIDE 0.9 % (FLUSH) 0.9 %
10 SYRINGE (ML) INJECTION AS NEEDED
Status: DISCONTINUED | OUTPATIENT
Start: 2022-08-08 | End: 2022-08-08 | Stop reason: HOSPADM

## 2022-08-08 RX ADMIN — ONDANSETRON 4 MG: 2 INJECTION INTRAMUSCULAR; INTRAVENOUS at 13:37

## 2022-08-08 RX ADMIN — SODIUM CHLORIDE 1000 ML: 9 INJECTION, SOLUTION INTRAVENOUS at 15:05

## 2022-08-08 RX ADMIN — SODIUM CHLORIDE 1000 ML: 9 INJECTION, SOLUTION INTRAVENOUS at 13:36

## 2022-08-08 NOTE — ED PROVIDER NOTES
Subjective   61-year-old female presents emergency department today with continued fever.  Her fever has been going on for about 17 to 18 days.  Its been as high as 103 last night and is low was 100.5 on multiple occasions.  She was seen by her PMD had negative tickborne illness panel.  Had negative COVID and negative influenza swabs.  She states that they did blood work and urinalysis and that she may have had the slightest UTI but took antibiotics for this.  She has not traveled outside the country.  She has no hardware i.e. hip replacements knee replacements or valve replacement  She states that she gets chills or rigors she has not really had a cough and maybe just occasional shortness of breath.  She does have a prior history of breast cancer about 10 years ago.  She denies any tick bites rash or lesions.  In all the work-up she did have a positive Monospot done by her PMD.      History provided by:  Patient   used: No    Fever  Max temp prior to arrival:  103  Temp source:  Oral  Severity:  Severe  Onset quality:  Sudden  Duration:  18 days  Timing:  Intermittent  Progression:  Waxing and waning  Chronicity:  New  Relieved by:  Acetaminophen  Worsened by:  Nothing  Ineffective treatments:  None tried  Associated symptoms: chills    Associated symptoms: no chest pain, no confusion, no congestion, no diarrhea, no dysuria, no ear pain, no headaches, no myalgias, no nausea, no rash, no somnolence, no sore throat and no vomiting    Risk factors: no contaminated food, no contaminated water, no hx of cancer, no immunosuppression, no occupational exposure, no recent sickness, no recent travel and no sick contacts        Review of Systems   Constitutional: Positive for chills and fever.   HENT: Negative for congestion, ear pain and sore throat.    Respiratory: Negative for chest tightness and shortness of breath.    Cardiovascular: Negative for chest pain and palpitations.   Gastrointestinal:  Negative for diarrhea, nausea and vomiting.   Genitourinary: Negative for dysuria, flank pain, hematuria and urgency.   Musculoskeletal: Negative for back pain and myalgias.   Skin: Negative for pallor and rash.   Neurological: Negative for headaches.   Psychiatric/Behavioral: Negative for confusion.   All other systems reviewed and are negative.      Past Medical History:   Diagnosis Date   • A-fib (HCC)    • Breast cancer (HCC) 2012    LEFT   • Hx of radiation therapy 2012    LEFT BREAST CANCER       Allergies   Allergen Reactions   • Tamoxifen Itching   • Povidone Iodine Rash       Past Surgical History:   Procedure Laterality Date   • BREAST BIOPSY Right    • BREAST BIOPSY Right 2011   • BREAST EXCISIONAL BIOPSY Left 2012   • BREAST LUMPECTOMY Left 2012   • HYSTERECTOMY Bilateral 2014   • OOPHORECTOMY Bilateral 2014       Family History   Problem Relation Age of Onset   • Breast cancer Neg Hx    • Ovarian cancer Neg Hx        Social History     Socioeconomic History   • Marital status:    Tobacco Use   • Smoking status: Never Smoker   • Smokeless tobacco: Never Used   Substance and Sexual Activity   • Alcohol use: No           Objective   Physical Exam  Vitals and nursing note reviewed.   Constitutional:       Appearance: She is well-developed.      Comments: I was in the room and they are doing orthostatic she did get dizzy with standing.   HENT:      Head: Normocephalic and atraumatic.      Right Ear: External ear normal.      Left Ear: External ear normal.      Nose: Nose normal.   Eyes:      General: No scleral icterus.     Conjunctiva/sclera: Conjunctivae normal.      Pupils: Pupils are equal, round, and reactive to light.   Neck:      Thyroid: No thyromegaly.   Cardiovascular:      Rate and Rhythm: Regular rhythm. Tachycardia present.      Heart sounds: Normal heart sounds.   Pulmonary:      Effort: Pulmonary effort is normal. No respiratory distress.      Breath sounds: Normal breath sounds. No  wheezing or rales.   Chest:      Chest wall: No tenderness.   Abdominal:      General: Bowel sounds are normal. There is no distension.      Palpations: Abdomen is soft.      Tenderness: There is no abdominal tenderness.   Musculoskeletal:         General: Normal range of motion.      Cervical back: Normal range of motion.   Lymphadenopathy:      Cervical: No cervical adenopathy.   Skin:     General: Skin is warm and dry.   Neurological:      Mental Status: She is alert and oriented to person, place, and time.      Cranial Nerves: No cranial nerve deficit.      Coordination: Coordination normal.      Deep Tendon Reflexes: Reflexes are normal and symmetric. Reflexes normal.   Psychiatric:         Behavior: Behavior normal.         Thought Content: Thought content normal.         Judgment: Judgment normal.         Procedures           ED Course  ED Course as of 08/09/22 1112   Mon Aug 08, 2022   1547 Discussed with Dr. Sergo Sweet he is going to see her in follow-up on Wednesday.  He asked me to repeat the ehrlichiosis PCR specifically.  Blood cultures are pending. [SANDY]      ED Course User Index  [SANDY] Victor Hugo Rodriguez PA                Recent Results (from the past 24 hour(s))   ECG 12 Lead    Collection Time: 08/08/22 12:49 PM   Result Value Ref Range    QT Interval 298 ms    QTC Interval 419 ms   Comprehensive Metabolic Panel    Collection Time: 08/08/22  1:24 PM    Specimen: Blood   Result Value Ref Range    Glucose 131 (H) 65 - 99 mg/dL    BUN 10 6 - 20 mg/dL    Creatinine 0.98 0.57 - 1.00 mg/dL    Sodium 132 (L) 136 - 145 mmol/L    Potassium 3.9 3.5 - 5.2 mmol/L    Chloride 96 (L) 98 - 107 mmol/L    CO2 26.0 22.0 - 29.0 mmol/L    Calcium 8.1 (L) 8.6 - 10.5 mg/dL    Total Protein 6.4 6.0 - 8.5 g/dL    Albumin 2.80 (L) 3.50 - 5.20 g/dL    ALT (SGPT) 27 1 - 33 U/L    AST (SGOT) 45 (H) 1 - 32 U/L    Alkaline Phosphatase 91 39 - 117 U/L    Total Bilirubin 0.4 0.0 - 1.2 mg/dL    Globulin 3.6 gm/dL    A/G Ratio  0.8 g/dL    BUN/Creatinine Ratio 10.2 7.0 - 25.0    Anion Gap 10.0 5.0 - 15.0 mmol/L    eGFR 66.6 >60.0 mL/min/1.73   Troponin    Collection Time: 08/08/22  1:24 PM    Specimen: Blood   Result Value Ref Range    Troponin T <0.010 0.000 - 0.030 ng/mL   Magnesium    Collection Time: 08/08/22  1:24 PM    Specimen: Blood   Result Value Ref Range    Magnesium 2.1 1.6 - 2.6 mg/dL   Green Top (Gel)    Collection Time: 08/08/22  1:24 PM   Result Value Ref Range    Extra Tube Hold for add-ons.    Lavender Top    Collection Time: 08/08/22  1:24 PM   Result Value Ref Range    Extra Tube hold for add-on    Gold Top - SST    Collection Time: 08/08/22  1:24 PM   Result Value Ref Range    Extra Tube Hold for add-ons.    Gray Top    Collection Time: 08/08/22  1:24 PM   Result Value Ref Range    Extra Tube Hold for add-ons.    Light Blue Top    Collection Time: 08/08/22  1:24 PM   Result Value Ref Range    Extra Tube Hold for add-ons.    CBC Auto Differential    Collection Time: 08/08/22  1:24 PM    Specimen: Blood   Result Value Ref Range    WBC 8.60 3.40 - 10.80 10*3/mm3    RBC 4.41 3.77 - 5.28 10*6/mm3    Hemoglobin 13.3 12.0 - 15.9 g/dL    Hematocrit 40.0 34.0 - 46.6 %    MCV 90.7 79.0 - 97.0 fL    MCH 30.2 26.6 - 33.0 pg    MCHC 33.3 31.5 - 35.7 g/dL    RDW 13.3 12.3 - 15.4 %    RDW-SD 44.0 37.0 - 54.0 fl    MPV 9.3 6.0 - 12.0 fL    Platelets 193 140 - 450 10*3/mm3   Lactic Acid, Plasma    Collection Time: 08/08/22  1:24 PM    Specimen: Blood   Result Value Ref Range    Lactate 1.7 0.5 - 2.0 mmol/L   Manual Differential    Collection Time: 08/08/22  1:24 PM    Specimen: Blood   Result Value Ref Range    Neutrophil % 38.0 (L) 42.7 - 76.0 %    Lymphocyte % 27.0 19.6 - 45.3 %    Monocyte % 8.0 5.0 - 12.0 %    Eosinophil % 0.0 (L) 0.3 - 6.2 %    Basophil % 0.0 0.0 - 1.5 %    Bands %  27.0 (H) 0.0 - 5.0 %    Neutrophils Absolute 5.59 1.70 - 7.00 10*3/mm3    Lymphocytes Absolute 2.32 0.70 - 3.10 10*3/mm3    Monocytes Absolute 0.69  0.10 - 0.90 10*3/mm3    Eosinophils Absolute 0.00 0.00 - 0.40 10*3/mm3    Basophils Absolute 0.00 0.00 - 0.20 10*3/mm3    Anisocytosis Mod/2+ None Seen    WBC Morphology Normal Normal    Platelet Estimate Adequate Normal    Clumped Platelets Present None Seen   Mononucleosis Screen    Collection Time: 08/08/22  1:24 PM    Specimen: Blood   Result Value Ref Range    Monospot Positive (A) Negative   Urinalysis With Microscopic If Indicated (No Culture) - Urine, Clean Catch    Collection Time: 08/08/22  1:32 PM    Specimen: Urine, Clean Catch   Result Value Ref Range    Color, UA Yellow Yellow, Straw    Appearance, UA Clear Clear    pH, UA 5.5 5.0 - 8.0    Specific Gravity, UA 1.024 1.001 - 1.030    Glucose, UA Negative Negative    Ketones, UA Negative Negative    Bilirubin, UA Negative Negative    Blood, UA Negative Negative    Protein, UA 30 mg/dL (1+) (A) Negative    Leuk Esterase, UA Trace (A) Negative    Nitrite, UA Negative Negative    Urobilinogen, UA 1.0 E.U./dL 0.2 - 1.0 E.U./dL   Urinalysis, Microscopic Only - Urine, Clean Catch    Collection Time: 08/08/22  1:32 PM    Specimen: Urine, Clean Catch   Result Value Ref Range    RBC, UA None Seen None Seen, 0-2 /HPF    WBC, UA 0-2 None Seen, 0-2 /HPF    Bacteria, UA 1+ (A) None Seen, Trace /HPF    Squamous Epithelial Cells, UA 7-12 (A) None Seen, 0-2 /HPF    Hyaline Casts, UA 0-6 0 - 6 /LPF    Methodology Manual Light Microscopy    COVID-19 and FLU A/B PCR - Swab, Nasopharynx    Collection Time: 08/08/22  1:36 PM    Specimen: Nasopharynx; Swab   Result Value Ref Range    COVID19 Not Detected Not Detected - Ref. Range    Influenza A PCR Not Detected Not Detected    Influenza B PCR Not Detected Not Detected     Note: In addition to lab results from this visit, the labs listed above may include labs taken at another facility or during a different encounter within the last 24 hours. Please correlate lab times with ED admission and discharge times for further  clarification of the services performed during this visit.    XR Chest 1 View   Final Result   Streaky bibasilar opacities are favored to reflect atelectasis or   scarring.       This report was finalized on 8/8/2022 1:20 PM by Chad Marks MD.            Vitals:    08/08/22 1430 08/08/22 1507 08/08/22 1530 08/08/22 1541   BP: 117/74 110/65 118/74    BP Location:       Patient Position:       Pulse: 102 102 105 105   Resp:       Temp:       TempSrc:       SpO2: 94% 94% 98%    Weight:       Height:         Medications   sodium chloride 0.9 % bolus 1,000 mL (0 mL Intravenous Stopped 8/8/22 1501)   ondansetron (ZOFRAN) injection 4 mg (4 mg Intravenous Given 8/8/22 1337)   sodium chloride 0.9 % bolus 1,000 mL (0 mL Intravenous Stopped 8/8/22 1535)     ECG/EMG Results (last 24 hours)     Procedure Component Value Units Date/Time    ECG 12 Lead [[id number]] Collected: 08/08/22 1249     Updated: 08/08/22 1328     QT Interval 298 ms      QTC Interval 419 ms     Narrative:      Test Reason : Weak/Dizzy/AMS protocol  Blood Pressure :   */*   mmHG  Vent. Rate : 119 BPM     Atrial Rate : 119 BPM     P-R Int : 124 ms          QRS Dur :  74 ms      QT Int : 298 ms       P-R-T Axes :  31 -15  -7 degrees     QTc Int : 419 ms    Sinus tachycardia  Possible Anterior infarct , age undetermined  Abnormal ECG  When compared with ECG of 04-AUG-2018 11:25,  Nonspecific T wave abnormality now evident in Anterior leads    Referred By:            Confirmed By:         ECG 12 Lead   Final Result   Test Reason : Weak/Dizzy/AMS protocol   Blood Pressure :   */*   mmHG   Vent. Rate : 119 BPM     Atrial Rate : 119 BPM      P-R Int : 124 ms          QRS Dur :  74 ms       QT Int : 298 ms       P-R-T Axes :  31 -15  -7 degrees      QTc Int : 419 ms      Sinus tachycardia   Possible Anterior infarct , age undetermined   Abnormal ECG   When compared with ECG of 04-AUG-2018 11:25,   Nonspecific T wave abnormality now evident in Anterior leads    Confirmed by MIRELLA ROBERSON ([id number]) on 8/8/2022 5:01:43 PM      Referred By:            Confirmed By: MIRELLA ROBERSON                                     MDM  Number of Diagnoses or Management Options  Infectious mononucleosis without complication, infectious mononucleosis due to unspecified organism: new and requires workup  Volume depletion: new and requires workup     Amount and/or Complexity of Data Reviewed  Clinical lab tests: reviewed and ordered  Tests in the radiology section of CPT®: reviewed and ordered  Tests in the medicine section of CPT®: reviewed and ordered  Decide to obtain previous medical records or to obtain history from someone other than the patient: yes  Discuss the patient with other providers: yes    Patient Progress  Patient progress: stable      Final diagnoses:   Volume depletion   Infectious mononucleosis without complication, infectious mononucleosis due to unspecified organism       ED Disposition  ED Disposition     ED Disposition   Discharge    Condition   Stable    Comment   --             Sergo Sweet MD  [address] [id number] [phone]      They will contact you for an exact time for follow-up         Medication List      No changes were made to your prescriptions during this visit.          Victor Hugo Rodriguez PA  08/09/22 1118

## 2022-08-13 LAB
BACTERIA SPEC AEROBE CULT: NORMAL
BACTERIA SPEC AEROBE CULT: NORMAL

## 2022-08-15 LAB
A PHAGOCYTOPH DNA BLD QL NAA+PROBE: NEGATIVE
E CHAFFEENSIS DNA BLD QL NAA+PROBE: NEGATIVE

## 2022-11-28 ENCOUNTER — TRANSCRIBE ORDERS (OUTPATIENT)
Dept: ADMINISTRATIVE | Facility: HOSPITAL | Age: 62
End: 2022-11-28

## 2022-11-28 DIAGNOSIS — Z12.31 VISIT FOR SCREENING MAMMOGRAM: Primary | ICD-10-CM

## 2022-12-08 ENCOUNTER — HOSPITAL ENCOUNTER (OUTPATIENT)
Dept: MAMMOGRAPHY | Facility: HOSPITAL | Age: 62
Discharge: HOME | End: 2022-12-08
Admitting: OBSTETRICS & GYNECOLOGY

## 2022-12-08 DIAGNOSIS — Z12.31 VISIT FOR SCREENING MAMMOGRAM: ICD-10-CM

## 2022-12-08 PROCEDURE — 77063 BREAST TOMOSYNTHESIS BI: CPT | Performed by: RADIOLOGY

## 2022-12-08 PROCEDURE — 77067 SCR MAMMO BI INCL CAD: CPT

## 2022-12-08 PROCEDURE — 77067 SCR MAMMO BI INCL CAD: CPT | Performed by: RADIOLOGY

## 2022-12-08 PROCEDURE — 77063 BREAST TOMOSYNTHESIS BI: CPT

## 2023-01-06 ENCOUNTER — APPOINTMENT (OUTPATIENT)
Dept: MAMMOGRAPHY | Facility: HOSPITAL | Age: 63
End: 2023-01-06

## 2023-02-14 ENCOUNTER — OFFICE VISIT (OUTPATIENT)
Dept: OBSTETRICS AND GYNECOLOGY | Facility: CLINIC | Age: 63
End: 2023-02-14
Payer: COMMERCIAL

## 2023-02-14 VITALS
BODY MASS INDEX: 27.89 KG/M2 | SYSTOLIC BLOOD PRESSURE: 130 MMHG | WEIGHT: 157.4 LBS | HEIGHT: 63 IN | DIASTOLIC BLOOD PRESSURE: 82 MMHG

## 2023-02-14 DIAGNOSIS — Z01.419 WOMEN'S ANNUAL ROUTINE GYNECOLOGICAL EXAMINATION: Primary | ICD-10-CM

## 2023-02-14 DIAGNOSIS — Z85.3 PERSONAL HISTORY OF BREAST CANCER: ICD-10-CM

## 2023-02-14 PROCEDURE — 99396 PREV VISIT EST AGE 40-64: CPT | Performed by: NURSE PRACTITIONER

## 2023-02-14 NOTE — PROGRESS NOTES
Gynecologic Annual Exam Note        GYN Annual Exam     CC - Here for annual exam.        BUBBA  Jackelyn Diaz is a 62 y.o. female, , who presents for annual well woman exam as a established patient.  She is s/p EDWARD/BSO in  for an enlarged uterus due to Tamoxifen use .. Denies vaginal bleeding.  Patient reports problems with: none. There were no changes to her medical or surgical history since her last visit.. Partner Status: Marital Status: .  She is is sexually active. She has not had new partners. STD testing recommendations have been explained to the patient and she does not desire STD testing. Pt. Has a personal history of breast cancer in left breast in .     Would like a pap smear today due to history. [facility] waiver signed and reviewed.    Additional OB/GYN History   On HRT? No    Last Pap : 01/10/2022. Results: negative. HPV: negative.  Pt. Request pap this year too.   Last Completed Pap Smear          Ordered - PAP SMEAR (Every 3 Years) Ordered on 2023    01/10/2022  SCANNED - PAP SMEAR    2021  Pap IG, Rfx HPV ASCU    2016  Done - negative              History of abnormal Pap smear: no  Family history of uterine, colon, breast, or ovarian cancer: yes - family hx of colon cancer, personal history of left breast cancer in  with lumpectomy and radiation therapy   Performs monthly Self-Breast Exam: yes  Last mammogram: 2022. Done at [facility].    Last Completed Mammogram          MAMMOGRAM (Yearly) Next due on 2022  Mammo Screening Digital Tomosynthesis Bilateral With CAD    10/15/2021  Mammo Screening Digital Tomosynthesis Bilateral With CAD    10/14/2020  Mammo Screening Digital Tomosynthesis Bilateral With CAD    2019  Mammo Diagnostic Right With CAD    2019  Mammo Screening Digital Tomosynthesis Bilateral With CAD    Only the first 5 history entries have been loaded, but more history exists.              Last  colonoscopy: has had a colonoscopy 1 year(s) ago. and was normal    Last Completed Colonoscopy     This patient has no relevant Health Maintenance data.            Last bone density scan (DEXA): None- was scheduled for one two different times but the out of pocket expense was super high   Exercises Regularly: yes- walking, arm weights   Feelings of Anxiety or Depression: no      Tobacco Usage?: No       Current Outpatient Medications:   •  Clobetasol Propionate 0.05 % external spray, , Disp: , Rfl:   •  Cloderm 0.1 % cream, , Disp: , Rfl:   •  fluticasone (FLONASE) 50 MCG/ACT nasal spray, 2 sprays into the nostril(s) as directed by provider Daily., Disp: , Rfl:   •  LORATADINE ALLERGY RELIEF PO, Take  by mouth., Disp: , Rfl:   •  aspirin 81 MG chewable tablet, Chew 81 mg Daily., Disp: , Rfl:     Patient denies the need for medication refills today.    OB History        2    Para   2    Term   2            AB        Living           SAB        IAB        Ectopic        Molar        Multiple        Live Births                    Past Medical History:   Diagnosis Date   • A-fib (HCC)    • Anemia    • Asthma    • Breast cancer (HCC) 2012    LEFT   • Gestational diabetes    • Hx of radiation therapy 2012    LEFT BREAST CANCER   • Hyperlipidemia    • Kidney stone    • Osteoarthritis    • PONV (postoperative nausea and vomiting)    • Varicella         Past Surgical History:   Procedure Laterality Date   • BREAST EXCISIONAL BIOPSY Right    • BREAST LUMPECTOMY Left    • HYSTEROSCOPY     • TOTAL ABDOMINAL HYSTERECTOMY WITH SALPINGO OOPHORECTOMY      enlarged uterus due to tamoxifen reaction   • WISDOM TOOTH EXTRACTION         Health Maintenance   Topic Date Due   • DXA SCAN  Never done   • COVID-19 Vaccine (1) Never done   • TDAP/TD VACCINES (2 - Tdap) 2009   • HEPATITIS C SCREENING  Never done   • ANNUAL PHYSICAL  Never done   • COLORECTAL CANCER SCREENING  2020   • INFLUENZA VACCINE  Never  "done   • Annual Gynecologic Pelvic and Breast Exam  01/11/2023   • ZOSTER VACCINE (2 of 2) 01/12/2023   • LIPID PANEL  02/14/2023   • MAMMOGRAM  12/08/2023   • PAP SMEAR  01/10/2025   • Pneumococcal Vaccine 0-64  Aged Out       The additional following portions of the patient's history were reviewed and updated as appropriate: allergies, current medications, past family history, past medical history, past social history, past surgical history and problem list.    Review of Systems   Constitutional: Negative.    Cardiovascular: Negative.    Gastrointestinal: Negative.    Genitourinary: Negative.    Psychiatric/Behavioral: Negative.        I have reviewed and agree with the HPI, ROS, and historical information as entered above. Dang Montaño, APRN    Objective   /82   Ht 160 cm (63\")   Wt 71.4 kg (157 lb 6.4 oz)   BMI 27.88 kg/m²     Physical Exam  Vitals and nursing note reviewed. Exam conducted with a chaperone present.   Constitutional:       Appearance: She is well-developed.   HENT:      Head: Normocephalic and atraumatic.   Neck:      Thyroid: No thyroid mass or thyromegaly.   Cardiovascular:      Rate and Rhythm: Normal rate and regular rhythm.      Heart sounds: No murmur heard.  Pulmonary:      Effort: Pulmonary effort is normal. No retractions.      Breath sounds: Normal breath sounds. No wheezing, rhonchi or rales.   Chest:      Chest wall: No mass or tenderness.   Breasts:     Right: Normal. No mass, nipple discharge, skin change or tenderness.      Left: Normal. No mass, nipple discharge, skin change or tenderness.      Comments: S/p left breast lumpectomy after breast cancer diagnosis in 2012  Abdominal:      Palpations: Abdomen is soft. Abdomen is not rigid. There is no mass.      Tenderness: There is no abdominal tenderness. There is no guarding.      Hernia: No hernia is present.   Genitourinary:     General: Normal vulva.      Labia:         Right: No rash, tenderness or lesion.         " Left: No rash, tenderness or lesion.       Vagina: Normal. No vaginal discharge or lesions.      Uterus: Absent.       Rectum: Normal. No external hemorrhoid.   Musculoskeletal:      Cervical back: Normal range of motion. No muscular tenderness.   Lymphadenopathy:      Upper Body:      Right upper body: No supraclavicular, axillary or pectoral adenopathy.      Left upper body: No supraclavicular, axillary or pectoral adenopathy.   Neurological:      Mental Status: She is alert and oriented to person, place, and time.   Psychiatric:         Behavior: Behavior normal.            Assessment and Plan    Problem List Items Addressed This Visit    None  Visit Diagnoses     Women's annual routine gynecological examination    -  Primary    Relevant Orders    LIQUID-BASED PAP SMEAR, [facility] ([facility])    Personal history of breast cancer              1. GYN annual well woman exam.    2. Pap smear guidelines reviewed with pt. Had normal pap and neg HPV in 1/2022 but pt. Requesting to have pap again this year. She has a personal hx of breast cancer and wants to continue yearly pap smears.   3. Had normal mammogram in 12/2022  4. Had normal colonoscopy in 2022  5. Pt. Tried to schedule BDS twice but insurance company representative told her she would have to pay $5200 first b/c she had to meet her deductible in order for this to be covered.   6. Reviewed monthly self breast exams.  Instructed to call with lumps, pain, or breast discharge.  Yearly mammograms ordered.  7. Recommended use of Vitamin D and getting adequate calcium in her diet. (1500mg)  8. Recommended Flu Vaccine in Fall of each year.  9. RTC in 1 year or PRN with problems.      Dang Montaño, APRN  02/14/2023

## 2023-02-16 LAB — REF LAB TEST METHOD: NORMAL

## 2024-01-01 ENCOUNTER — APPOINTMENT (OUTPATIENT)
Dept: MRI IMAGING | Facility: HOSPITAL | Age: 64
End: 2024-01-01
Payer: COMMERCIAL

## 2024-01-01 ENCOUNTER — APPOINTMENT (OUTPATIENT)
Dept: CT IMAGING | Facility: HOSPITAL | Age: 64
End: 2024-01-01
Payer: COMMERCIAL

## 2024-01-01 ENCOUNTER — HOSPITAL ENCOUNTER (OUTPATIENT)
Facility: HOSPITAL | Age: 64
Setting detail: OBSERVATION
Discharge: HOME | End: 2024-01-02
Attending: STUDENT IN AN ORGANIZED HEALTH CARE EDUCATION/TRAINING PROGRAM | Admitting: INTERNAL MEDICINE
Payer: COMMERCIAL

## 2024-01-01 DIAGNOSIS — E78.5 HYPERLIPIDEMIA, UNSPECIFIED HYPERLIPIDEMIA TYPE: ICD-10-CM

## 2024-01-01 DIAGNOSIS — R13.12 OROPHARYNGEAL DYSPHAGIA: ICD-10-CM

## 2024-01-01 DIAGNOSIS — R47.81 SLURRED SPEECH: Primary | ICD-10-CM

## 2024-01-01 PROBLEM — Z86.718 HISTORY OF DVT (DEEP VEIN THROMBOSIS): Status: ACTIVE | Noted: 2024-01-01

## 2024-01-01 PROBLEM — Z86.19 HISTORY OF INFECTIOUS MONONUCLEOSIS: Status: ACTIVE | Noted: 2022-07-01

## 2024-01-01 PROBLEM — Z90.710 HISTORY OF HYSTERECTOMY: Status: ACTIVE | Noted: 2024-01-01

## 2024-01-01 PROBLEM — J45.909 ASTHMA: Status: ACTIVE | Noted: 2024-01-01

## 2024-01-01 PROBLEM — Z86.19 HISTORY OF HELICOBACTER PYLORI INFECTION: Status: ACTIVE | Noted: 2023-10-01

## 2024-01-01 PROBLEM — Z87.19 HISTORY OF PANCREATITIS: Status: ACTIVE | Noted: 2024-01-01

## 2024-01-01 PROBLEM — R47.9 DIFFICULTY WITH SPEECH: Status: ACTIVE | Noted: 2024-01-01

## 2024-01-01 PROBLEM — D63.8 ANEMIA, CHRONIC DISEASE: Status: ACTIVE | Noted: 2024-01-01

## 2024-01-01 PROBLEM — Z85.3 HISTORY OF BREAST CANCER: Status: ACTIVE | Noted: 2024-01-01

## 2024-01-01 LAB
ALBUMIN SERPL-MCNC: 4.4 G/DL (ref 3.5–5.2)
ALBUMIN/GLOB SERPL: 1.4 G/DL
ALP SERPL-CCNC: 83 U/L (ref 39–117)
ALT SERPL W P-5'-P-CCNC: 19 U/L (ref 1–33)
ANION GAP SERPL CALCULATED.3IONS-SCNC: 10 MMOL/L (ref 5–15)
APTT PPP: 27.7 SECONDS (ref 60–90)
AST SERPL-CCNC: 24 U/L (ref 1–32)
BACTERIA UR QL AUTO: NORMAL /HPF
BASOPHILS # BLD AUTO: 0.04 10*3/MM3 (ref 0–0.2)
BASOPHILS NFR BLD AUTO: 0.8 % (ref 0–1.5)
BILIRUB SERPL-MCNC: 0.4 MG/DL (ref 0–1.2)
BILIRUB UR QL STRIP: NEGATIVE
BUN SERPL-MCNC: 10 MG/DL (ref 8–23)
BUN/CREAT SERPL: 10.2 (ref 7–25)
CALCIUM SPEC-SCNC: 9.4 MG/DL (ref 8.6–10.5)
CHLORIDE SERPL-SCNC: 105 MMOL/L (ref 98–107)
CLARITY UR: CLEAR
CO2 SERPL-SCNC: 28 MMOL/L (ref 22–29)
COLOR UR: YELLOW
CREAT SERPL-MCNC: 0.98 MG/DL (ref 0.57–1)
D-LACTATE SERPL-SCNC: 1.3 MMOL/L (ref 0.5–2)
DEPRECATED RDW RBC AUTO: 39.2 FL (ref 37–54)
EGFRCR SERPLBLD CKD-EPI 2021: 65.8 ML/MIN/1.73
EOSINOPHIL # BLD AUTO: 0.14 10*3/MM3 (ref 0–0.4)
EOSINOPHIL NFR BLD AUTO: 2.8 % (ref 0.3–6.2)
ERYTHROCYTE [DISTWIDTH] IN BLOOD BY AUTOMATED COUNT: 11.9 % (ref 12.3–15.4)
GLOBULIN UR ELPH-MCNC: 3.1 GM/DL
GLUCOSE SERPL-MCNC: 86 MG/DL (ref 65–99)
GLUCOSE UR STRIP-MCNC: NEGATIVE MG/DL
HCT VFR BLD AUTO: 47.1 % (ref 34–46.6)
HGB BLD-MCNC: 15.8 G/DL (ref 12–15.9)
HGB UR QL STRIP.AUTO: NEGATIVE
HYALINE CASTS UR QL AUTO: NORMAL /LPF
IMM GRANULOCYTES # BLD AUTO: 0.01 10*3/MM3 (ref 0–0.05)
IMM GRANULOCYTES NFR BLD AUTO: 0.2 % (ref 0–0.5)
INR PPP: 0.9 (ref 0.89–1.12)
KETONES UR QL STRIP: NEGATIVE
LEUKOCYTE ESTERASE UR QL STRIP.AUTO: ABNORMAL
LYMPHOCYTES # BLD AUTO: 1.86 10*3/MM3 (ref 0.7–3.1)
LYMPHOCYTES NFR BLD AUTO: 37 % (ref 19.6–45.3)
MAGNESIUM SERPL-MCNC: 2.2 MG/DL (ref 1.6–2.4)
MCH RBC QN AUTO: 30.5 PG (ref 26.6–33)
MCHC RBC AUTO-ENTMCNC: 33.5 G/DL (ref 31.5–35.7)
MCV RBC AUTO: 90.9 FL (ref 79–97)
MONOCYTES # BLD AUTO: 0.33 10*3/MM3 (ref 0.1–0.9)
MONOCYTES NFR BLD AUTO: 6.6 % (ref 5–12)
NEUTROPHILS NFR BLD AUTO: 2.65 10*3/MM3 (ref 1.7–7)
NEUTROPHILS NFR BLD AUTO: 52.6 % (ref 42.7–76)
NITRITE UR QL STRIP: NEGATIVE
NRBC BLD AUTO-RTO: 0 /100 WBC (ref 0–0.2)
PH UR STRIP.AUTO: 7 [PH] (ref 5–8)
PHOSPHATE SERPL-MCNC: 3.3 MG/DL (ref 2.5–4.5)
PLATELET # BLD AUTO: 331 10*3/MM3 (ref 140–450)
PMV BLD AUTO: 8.6 FL (ref 6–12)
POTASSIUM SERPL-SCNC: 3.9 MMOL/L (ref 3.5–5.2)
PROT SERPL-MCNC: 7.5 G/DL (ref 6–8.5)
PROT UR QL STRIP: NEGATIVE
PROTHROMBIN TIME: 12.3 SECONDS (ref 12.2–14.5)
QT INTERVAL: 396 MS
QTC INTERVAL: 415 MS
RBC # BLD AUTO: 5.18 10*6/MM3 (ref 3.77–5.28)
RBC # UR STRIP: NORMAL /HPF
REF LAB TEST METHOD: NORMAL
SODIUM SERPL-SCNC: 143 MMOL/L (ref 136–145)
SP GR UR STRIP: 1.01 (ref 1–1.03)
SQUAMOUS #/AREA URNS HPF: NORMAL /HPF
T4 FREE SERPL-MCNC: 1.4 NG/DL (ref 0.93–1.7)
TROPONIN T SERPL HS-MCNC: 8 NG/L
TSH SERPL DL<=0.05 MIU/L-ACNC: 1.99 UIU/ML (ref 0.27–4.2)
UROBILINOGEN UR QL STRIP: ABNORMAL
WBC # UR STRIP: NORMAL /HPF
WBC NRBC COR # BLD AUTO: 5.03 10*3/MM3 (ref 3.4–10.8)

## 2024-01-01 PROCEDURE — G0378 HOSPITAL OBSERVATION PER HR: HCPCS

## 2024-01-01 PROCEDURE — 25810000003 SODIUM CHLORIDE 0.9 % SOLUTION: Performed by: STUDENT IN AN ORGANIZED HEALTH CARE EDUCATION/TRAINING PROGRAM

## 2024-01-01 PROCEDURE — 80050 GENERAL HEALTH PANEL: CPT | Performed by: STUDENT IN AN ORGANIZED HEALTH CARE EDUCATION/TRAINING PROGRAM

## 2024-01-01 PROCEDURE — 84484 ASSAY OF TROPONIN QUANT: CPT | Performed by: STUDENT IN AN ORGANIZED HEALTH CARE EDUCATION/TRAINING PROGRAM

## 2024-01-01 PROCEDURE — 85730 THROMBOPLASTIN TIME PARTIAL: CPT | Performed by: STUDENT IN AN ORGANIZED HEALTH CARE EDUCATION/TRAINING PROGRAM

## 2024-01-01 PROCEDURE — 84439 ASSAY OF FREE THYROXINE: CPT | Performed by: STUDENT IN AN ORGANIZED HEALTH CARE EDUCATION/TRAINING PROGRAM

## 2024-01-01 PROCEDURE — 83605 ASSAY OF LACTIC ACID: CPT | Performed by: STUDENT IN AN ORGANIZED HEALTH CARE EDUCATION/TRAINING PROGRAM

## 2024-01-01 PROCEDURE — 85610 PROTHROMBIN TIME: CPT | Performed by: STUDENT IN AN ORGANIZED HEALTH CARE EDUCATION/TRAINING PROGRAM

## 2024-01-01 PROCEDURE — 25510000001 IOPAMIDOL PER 1 ML: Performed by: STUDENT IN AN ORGANIZED HEALTH CARE EDUCATION/TRAINING PROGRAM

## 2024-01-01 PROCEDURE — 96360 HYDRATION IV INFUSION INIT: CPT

## 2024-01-01 PROCEDURE — 93005 ELECTROCARDIOGRAM TRACING: CPT | Performed by: STUDENT IN AN ORGANIZED HEALTH CARE EDUCATION/TRAINING PROGRAM

## 2024-01-01 PROCEDURE — 99222 1ST HOSP IP/OBS MODERATE 55: CPT | Performed by: NURSE PRACTITIONER

## 2024-01-01 PROCEDURE — 81001 URINALYSIS AUTO W/SCOPE: CPT | Performed by: STUDENT IN AN ORGANIZED HEALTH CARE EDUCATION/TRAINING PROGRAM

## 2024-01-01 PROCEDURE — 84100 ASSAY OF PHOSPHORUS: CPT | Performed by: STUDENT IN AN ORGANIZED HEALTH CARE EDUCATION/TRAINING PROGRAM

## 2024-01-01 PROCEDURE — 70498 CT ANGIOGRAPHY NECK: CPT

## 2024-01-01 PROCEDURE — 70551 MRI BRAIN STEM W/O DYE: CPT

## 2024-01-01 PROCEDURE — 83735 ASSAY OF MAGNESIUM: CPT | Performed by: STUDENT IN AN ORGANIZED HEALTH CARE EDUCATION/TRAINING PROGRAM

## 2024-01-01 PROCEDURE — 25010000002 HEPARIN (PORCINE) PER 1000 UNITS: Performed by: NURSE PRACTITIONER

## 2024-01-01 PROCEDURE — 96372 THER/PROPH/DIAG INJ SC/IM: CPT

## 2024-01-01 PROCEDURE — 99285 EMERGENCY DEPT VISIT HI MDM: CPT

## 2024-01-01 PROCEDURE — 99214 OFFICE O/P EST MOD 30 MIN: CPT | Performed by: NURSE PRACTITIONER

## 2024-01-01 PROCEDURE — 70496 CT ANGIOGRAPHY HEAD: CPT

## 2024-01-01 RX ORDER — HEPARIN SODIUM 5000 [USP'U]/ML
5000 INJECTION, SOLUTION INTRAVENOUS; SUBCUTANEOUS EVERY 8 HOURS SCHEDULED
Status: DISCONTINUED | OUTPATIENT
Start: 2024-01-01 | End: 2024-01-02 | Stop reason: HOSPADM

## 2024-01-01 RX ORDER — FLUTICASONE PROPIONATE 50 MCG
2 SPRAY, SUSPENSION (ML) NASAL DAILY PRN
Status: DISCONTINUED | OUTPATIENT
Start: 2024-01-02 | End: 2024-01-02 | Stop reason: HOSPADM

## 2024-01-01 RX ORDER — SODIUM CHLORIDE 9 MG/ML
40 INJECTION, SOLUTION INTRAVENOUS AS NEEDED
Status: DISCONTINUED | OUTPATIENT
Start: 2024-01-01 | End: 2024-01-02 | Stop reason: HOSPADM

## 2024-01-01 RX ORDER — BISACODYL 5 MG/1
5 TABLET, DELAYED RELEASE ORAL DAILY PRN
Status: DISCONTINUED | OUTPATIENT
Start: 2024-01-01 | End: 2024-01-02 | Stop reason: HOSPADM

## 2024-01-01 RX ORDER — PANTOPRAZOLE SODIUM 40 MG/1
40 TABLET, DELAYED RELEASE ORAL DAILY
COMMUNITY

## 2024-01-01 RX ORDER — SODIUM CHLORIDE 0.9 % (FLUSH) 0.9 %
10 SYRINGE (ML) INJECTION EVERY 12 HOURS SCHEDULED
Status: DISCONTINUED | OUTPATIENT
Start: 2024-01-01 | End: 2024-01-02 | Stop reason: HOSPADM

## 2024-01-01 RX ORDER — PANTOPRAZOLE SODIUM 40 MG/1
40 TABLET, DELAYED RELEASE ORAL
Status: DISCONTINUED | OUTPATIENT
Start: 2024-01-02 | End: 2024-01-02 | Stop reason: HOSPADM

## 2024-01-01 RX ORDER — NITROGLYCERIN 0.4 MG/1
0.4 TABLET SUBLINGUAL
Status: DISCONTINUED | OUTPATIENT
Start: 2024-01-01 | End: 2024-01-02 | Stop reason: HOSPADM

## 2024-01-01 RX ORDER — POLYETHYLENE GLYCOL 3350 17 G/17G
17 POWDER, FOR SOLUTION ORAL DAILY PRN
Status: DISCONTINUED | OUTPATIENT
Start: 2024-01-01 | End: 2024-01-02 | Stop reason: HOSPADM

## 2024-01-01 RX ORDER — SODIUM CHLORIDE 0.9 % (FLUSH) 0.9 %
10 SYRINGE (ML) INJECTION AS NEEDED
Status: DISCONTINUED | OUTPATIENT
Start: 2024-01-01 | End: 2024-01-02 | Stop reason: HOSPADM

## 2024-01-01 RX ORDER — ASPIRIN 81 MG/1
81 TABLET, CHEWABLE ORAL DAILY
Status: DISCONTINUED | OUTPATIENT
Start: 2024-01-02 | End: 2024-01-02 | Stop reason: HOSPADM

## 2024-01-01 RX ORDER — BISACODYL 10 MG
10 SUPPOSITORY, RECTAL RECTAL DAILY PRN
Status: DISCONTINUED | OUTPATIENT
Start: 2024-01-01 | End: 2024-01-02 | Stop reason: HOSPADM

## 2024-01-01 RX ADMIN — IOPAMIDOL 75 ML: 755 INJECTION, SOLUTION INTRAVENOUS at 14:01

## 2024-01-01 RX ADMIN — SODIUM CHLORIDE 1000 ML: 9 INJECTION, SOLUTION INTRAVENOUS at 13:19

## 2024-01-01 RX ADMIN — Medication 10 ML: at 21:27

## 2024-01-01 RX ADMIN — HEPARIN SODIUM 5000 UNITS: 5000 INJECTION INTRAVENOUS; SUBCUTANEOUS at 21:24

## 2024-01-01 NOTE — ED NOTES
" Jackelyn Diaz    Nursing Report ED to Floor:  Mental status: GCS 15  Ambulatory status: standby-x1 assist  Oxygen Therapy:  RA  Cardiac Rhythm: NSR  Admitted from: home  Safety Concerns:  none   Social Issues: none  ED Room #:  32    ED Nurse Phone Extension - [phone] or may call [phone].      HPI:   Chief Complaint   Patient presents with    Speech Problem       Past Medical History:  Past Medical History:   Diagnosis Date    A-fib     Anemia     Asthma     Breast cancer 2012    LEFT    Gestational diabetes     Hx of radiation therapy 2012    LEFT BREAST CANCER    Hyperlipidemia     Kidney stone     Osteoarthritis     PONV (postoperative nausea and vomiting)     Varicella         Past Surgical History:  Past Surgical History:   Procedure Laterality Date    BREAST EXCISIONAL BIOPSY Right 2011    BREAST LUMPECTOMY Left 2012    HYSTEROSCOPY      TOTAL ABDOMINAL HYSTERECTOMY WITH SALPINGO OOPHORECTOMY      enlarged uterus due to tamoxifen reaction    WISDOM TOOTH EXTRACTION          Admitting Doctor:   Medina Duenas MD    Consulting Provider(s):  Consults       No orders found from 12/3/2023 to 1/2/2024.             Admitting Diagnosis:   The primary encounter diagnosis was Slurred speech. Diagnoses of Oropharyngeal dysphagia and Hyperlipidemia, unspecified hyperlipidemia type were also pertinent to this visit.    Most Recent Vitals:   Vitals:    01/01/24 1241 01/01/24 1347 01/01/24 1416 01/01/24 1430   BP: 157/91 137/72 129/74    BP Location: Right arm      Patient Position: Sitting      Pulse: 88 75 66 67   Resp: 14      Temp: 97.8 °F (36.6 °C)      TempSrc: Oral      SpO2: 98% 97% 100%    Weight: 70.3 kg (154 lb 14.4 oz)      Height: 160 cm (63\")          Active LDAs/IV Access:   Lines, Drains & Airways       Active LDAs       Name Placement date Placement time Site Days    Peripheral IV 01/01/24 1318 Right Antecubital 01/01/24  1318  Antecubital  less than 1                    Labs (abnormal labs have a star): "   Labs Reviewed   APTT - Abnormal; Notable for the following components:       Result Value    PTT 27.7 (*)     All other components within normal limits    Narrative:     PTT = The equivalent PTT values for the therapeutic range of heparin levels at 0.3 to 0.5 U/ml are 60 to 70 seconds.   URINALYSIS W/ MICROSCOPIC IF INDICATED (NO CULTURE) - Abnormal; Notable for the following components:    Leuk Esterase, UA Trace (*)     All other components within normal limits   CBC WITH AUTO DIFFERENTIAL - Abnormal; Notable for the following components:    Hematocrit 47.1 (*)     RDW 11.9 (*)     All other components within normal limits   PROTIME-INR - Normal   SINGLE HSTROPONIN T - Normal    Narrative:     High Sensitive Troponin T Reference Range:  <14.0 ng/L- Negative Female for AMI  <22.0 ng/L- Negative Male for AMI  >=14 - Abnormal Female indicating possible myocardial injury.  >=22 - Abnormal Male indicating possible myocardial injury.   Clinicians would have to utilize clinical acumen, EKG, Troponin, and serial changes to determine if it is an Acute Myocardial Infarction or myocardial injury due to an underlying chronic condition.        LACTIC ACID, PLASMA - Normal   MAGNESIUM - Normal   PHOSPHORUS - Normal   TSH - Normal   T4, FREE - Normal    Narrative:     Results may be falsely increased if patient taking Biotin.     COMPREHENSIVE METABOLIC PANEL    Narrative:     GFR Normal >60  Chronic Kidney Disease <60  Kidney Failure <15     URINALYSIS, MICROSCOPIC ONLY   CBC AND DIFFERENTIAL    Narrative:     The following orders were created for panel order CBC & Differential.  Procedure                               Abnormality         Status                     ---------                               -----------         ------                     CBC Auto Differential[[id number]]        Abnormal            Final result                 Please view results for these tests on the individual orders.       Meds Given in ED:    Medications   sodium chloride 0.9 % bolus 1,000 mL (1,000 mL Intravenous New Bag 1/1/24 1319)   iopamidol (ISOVUE-370) 76 % injection 75 mL (75 mL Intravenous Given 1/1/24 1401)     No current facility-administered medications for this encounter.

## 2024-01-01 NOTE — H&P
"    [facility] Medicine Services  HISTORY AND PHYSICAL    Patient Name: Jackelyn Diaz  : 1960  MRN: [id number]  Primary Care Physician: Vikas Jimenes PA  Date of admission: 2024    Subjective   Subjective     Chief Complaint:  Slurred Speech    HPI:  Jackelyn Diaz is a 63 y.o. female PMH asthma, anemia, hx of pancreatitis (6 times). hx of hormone receptor (+) breast cancer s/p left lumpectomy and s/p hysterectomy (), hx of mono (2022), food impaction s/p EGD where she was found (+) H pylori and wheat intolerance () presenting acute onset of slurred speech that started 2 weeks ago that has worsened over the last 2 days. She states her tongue feels \"big\". She bit the right side of her tongue just prior to onset. She denies current paresthesia, muscle weakness of the extremities. She dis have perioral numbness at the onset. Chronic asymmetric smile.      Review of Systems   Constitutional:  Negative for activity change, appetite change and fatigue.   HENT: Negative.     Eyes: Negative.    Respiratory: Negative.     Cardiovascular: Negative.    Gastrointestinal: Negative.    Endocrine: Negative.    Genitourinary: Negative.    Musculoskeletal: Negative.    Skin: Negative.    Allergic/Immunologic: Positive for food allergies.        Allergic to Wheat   Neurological:  Positive for speech difficulty.   Hematological: Negative.    Psychiatric/Behavioral: Negative.        Personal History     Past Medical History:   Diagnosis Date    A-fib     Anemia     Asthma     Breast cancer 2012    LEFT    Gestational diabetes     Hx of radiation therapy 2012    LEFT BREAST CANCER    Hyperlipidemia     Kidney stone     Osteoarthritis     PONV (postoperative nausea and vomiting)     Varicella          Oncology Problem List:  Breast cancer (2018; Status: Active)    Oncology/Hematology History       Past Surgical History:   Procedure Laterality Date    BREAST EXCISIONAL BIOPSY " Right 2011    BREAST LUMPECTOMY Left 2012    HYSTEROSCOPY      TOTAL ABDOMINAL HYSTERECTOMY WITH SALPINGO OOPHORECTOMY      enlarged uterus due to tamoxifen reaction    WISDOM TOOTH EXTRACTION         Family History:  family history includes Colon cancer in her brother and maternal grandfather; Diabetes in her mother; Melanoma in her sister.     Social History:  reports that she has never smoked. She has never used smokeless tobacco. She reports that she does not drink alcohol and does not use drugs.  Social History     Social History Narrative    Not on file       Medications:  Clobetasol Propionate, Clocortolone Pivalate, Loratadine, aspirin, and fluticasone    Allergies   Allergen Reactions    Red Dye Other (See Comments)     pancreatitis    Tamoxifen Itching    Wheat GI Intolerance    Povidone Iodine Rash       Objective   Objective     Vital Signs:   Temp:  [97.8 °F (36.6 °C)] 97.8 °F (36.6 °C)  Heart Rate:  [66-88] 67  Resp:  [14] 14  BP: (129-157)/(72-91) 129/74    Physical Exam  Constitutional:       General: She is not in acute distress.     Appearance: Normal appearance. She is not ill-appearing.   HENT:      Head: Normocephalic and atraumatic.      Mouth/Throat:      Mouth: Mucous membranes are moist.      Pharynx: Oropharynx is clear.   Eyes:      General: No scleral icterus.     Conjunctiva/sclera: Conjunctivae normal.   Cardiovascular:      Rate and Rhythm: Normal rate and regular rhythm.      Heart sounds: No murmur heard.     No friction rub. No gallop.   Pulmonary:      Effort: Pulmonary effort is normal.      Breath sounds: Normal breath sounds.   Abdominal:      General: Bowel sounds are normal.   Musculoskeletal:      Cervical back: Neck supple.      Right lower leg: No edema.      Left lower leg: No edema.   Skin:     General: Skin is warm and dry.   Neurological:      Mental Status: She is alert and oriented to person, place, and time.      Cranial Nerves: Dysarthria present.   Psychiatric:          Mood and Affect: Mood normal.         Behavior: Behavior normal.          Result Review:  I have personally reviewed the results from the time of this admission to 1/1/2024 17:24 EST and agree with these findings:  [x]  Laboratory list / accordion  []  Microbiology  [x]  Radiology  [x]  EKG/Telemetry   []  Cardiology/Vascular   []  Pathology  [x]  Old records  []  Other:  LAB RESULTS:      Lab 01/01/24  1312   WBC 5.03   HEMOGLOBIN 15.8   HEMATOCRIT 47.1*   PLATELETS 331   NEUTROS ABS 2.65   IMMATURE GRANS (ABS) 0.01   LYMPHS ABS 1.86   MONOS ABS 0.33   EOS ABS 0.14   MCV 90.9   LACTATE 1.3   PROTIME 12.3   APTT 27.7*         Lab 01/01/24  1312   SODIUM 143   POTASSIUM 3.9   CHLORIDE 105   CO2 28.0   ANION GAP 10.0   BUN 10   CREATININE 0.98   EGFR 65.8   GLUCOSE 86   CALCIUM 9.4   MAGNESIUM 2.2   PHOSPHORUS 3.3   TSH 1.990         Lab 01/01/24  1312   TOTAL PROTEIN 7.5   ALBUMIN 4.4   GLOBULIN 3.1   ALT (SGPT) 19   AST (SGOT) 24   BILIRUBIN 0.4   ALK PHOS 83         Lab 01/01/24  1312   HSTROP T 8   PROTIME 12.3   INR 0.90                 Brief Urine Lab Results  (Last result in the past 365 days)        Color   Clarity   Blood   Leuk Est   Nitrite   Protein   CREAT   Urine HCG        01/01/24 1313 Yellow   Clear   Negative   Trace   Negative   Negative                 Microbiology Results (last 10 days)       ** No results found for the last 240 hours. **            MRI Brain Without Contrast    Result Date: 1/1/2024  MRI BRAIN WO CONTRAST Date of Exam: 1/1/2024 2:39 PM EST Indication: Neuro deficit, acute, stroke suspected stroke symptoms. Dysarthria  Comparison: CT angiography of the head and neck 1/1/2024 Technique:  Routine multiplanar/multisequence sequence images of the brain were obtained without contrast administration. Findings: No diffusion restriction to indicate acute ischemia. No mass effect or midline shift. No findings of intraparenchymal hemorrhage. No abnormal extra-axial fluid collection.  Posterior fossa without acute abnormality. Partially empty sella. No suprasellar mass. Ventricles and cortical sulci are normally configured. The major T2 weighted intracranial vascular flow voids are patent, intracranial vasculature better assessed on the separately performed CT angiography. No cerebellar pontine angle mass. Normal appearance of the cerebellum. The mastoid air cells are well-aerated. No sinus fluid level. Globes intact. No retro-orbital abnormality. Normal calvarial marrow signal. Visualized portions of the upper cervical spine demonstrate multilevel disc desiccation with posterior disc osteophyte complexes contributing to upper cervical canal stenosis at C3-4 and C5-6 partially imaged.     Impression: Impression: 1. No acute intracranial abnormality, specifically negative for acute ischemia. 2. Chronic findings above. Electronically Signed: Sánchez Beckwith MD  1/1/2024 3:11 PM EST  Workstation ID: [id number]    CT Angiogram Head w AI Analysis of LVO    Result Date: 1/1/2024  CT ANGIOGRAM HEAD W AI ANALYSIS OF LVO, CT ANGIOGRAM NECK Date of Exam: 1/1/2024 1:53 PM EST Indication: progressively worsening slurred speech 2 weeks,. Comparison: None available. Technique: CTA of the head and neck was performed after the uneventful intravenous administration of 75 mL Isovue-370. Reconstructed coronal and sagittal images were also obtained. In addition, a 3-D volume rendered image was created for interpretation. Automated exposure control and iterative reconstruction methods were used. Findings: Neck: Lung apices are clear. Visualized pulmonary arteries are normally opacified with contrast. The thoracic aortic arch branch vessels are patent. The left common, internal, and external carotid arteries are patent. Negative for left carotid stenosis. The distal left ICA is patent to the [place]. The right common carotid artery is patent with angulated, tortuous course proximally, similar to prior chest CT.  There is streak artifact from contrast bolus timing in the right subclavian vein which partially limits assessment of the proximal right common carotid artery. The right internal and external carotid arteries are patent. Negative for right-sided carotid stenosis. The distal right ICA is patent to the [place]. The left vertebral artery is dominant and patent throughout its course. The right vertebral artery is patent. No acute soft tissue abnormality in the neck. Internal jugular veins are patent within limits of contrast timing. Streak artifact from dental amalgam limits oral pharyngeal assessment. No acute abnormality of the aerodigestive soft tissues. Scattered cervical chain lymph nodes are below size criteria. Multilevel disc narrowing in the cervical spine. No aggressive osseous lesion or acute fracture. Head: Distal internal carotid arteries are patent. Both anterior cerebral arteries are patent. Anterior communicating artery patent. The left middle cerebral artery is patent at the M1 and visualized proximal M2 segments. The right middle cerebral artery is patent at the M1 and visualized proximal M2 segments. The basilar artery is patent. Bilateral superior cerebellar arteries are patent. Fetal origin of the right posterior cerebral artery. Predominant fetal origin of the left PCA. Both posterior cerebral arteries are patent. Basilar artery is patent. The bilateral superior cerebellar arteries are patent. No significant intracranial aneurysm. Within limits of contrast timing the major intracranial venous sinuses are patent. Negative for midline shift or mass effect. No findings of intracranial hemorrhage. Posterior fossa without acute abnormality. The globes are symmetric. The mastoid air cells are well-aerated. Negative for sinus fluid level.     Impression: Impression: 1. No central intracranial large vessel occlusion. 2. Patent bilateral carotid arteries. 3. Patent bilateral vertebral arteries.  Electronically Signed: Sánchez Beckwith MD  1/1/2024 2:42 PM EST  Workstation ID: [id number]    CT Angiogram Neck    Result Date: 1/1/2024  CT ANGIOGRAM HEAD W AI ANALYSIS OF LVO, CT ANGIOGRAM NECK Date of Exam: 1/1/2024 1:53 PM EST Indication: progressively worsening slurred speech 2 weeks,. Comparison: None available. Technique: CTA of the head and neck was performed after the uneventful intravenous administration of 75 mL Isovue-370. Reconstructed coronal and sagittal images were also obtained. In addition, a 3-D volume rendered image was created for interpretation. Automated exposure control and iterative reconstruction methods were used. Findings: Neck: Lung apices are clear. Visualized pulmonary arteries are normally opacified with contrast. The thoracic aortic arch branch vessels are patent. The left common, internal, and external carotid arteries are patent. Negative for left carotid stenosis. The distal left ICA is patent to the [place]. The right common carotid artery is patent with angulated, tortuous course proximally, similar to prior chest CT. There is streak artifact from contrast bolus timing in the right subclavian vein which partially limits assessment of the proximal right common carotid artery. The right internal and external carotid arteries are patent. Negative for right-sided carotid stenosis. The distal right ICA is patent to the [place]. The left vertebral artery is dominant and patent throughout its course. The right vertebral artery is patent. No acute soft tissue abnormality in the neck. Internal jugular veins are patent within limits of contrast timing. Streak artifact from dental amalgam limits oral pharyngeal assessment. No acute abnormality of the aerodigestive soft tissues. Scattered cervical chain lymph nodes are below size criteria. Multilevel disc narrowing in the cervical spine. No aggressive osseous lesion or acute fracture. Head: Distal internal carotid arteries are  patent. Both anterior cerebral arteries are patent. Anterior communicating artery patent. The left middle cerebral artery is patent at the M1 and visualized proximal M2 segments. The right middle cerebral artery is patent at the M1 and visualized proximal M2 segments. The basilar artery is patent. Bilateral superior cerebellar arteries are patent. Fetal origin of the right posterior cerebral artery. Predominant fetal origin of the left PCA. Both posterior cerebral arteries are patent. Basilar artery is patent. The bilateral superior cerebellar arteries are patent. No significant intracranial aneurysm. Within limits of contrast timing the major intracranial venous sinuses are patent. Negative for midline shift or mass effect. No findings of intracranial hemorrhage. Posterior fossa without acute abnormality. The globes are symmetric. The mastoid air cells are well-aerated. Negative for sinus fluid level.     Impression: Impression: 1. No central intracranial large vessel occlusion. 2. Patent bilateral carotid arteries. 3. Patent bilateral vertebral arteries. Electronically Signed: Sánchez Beckwith MD  1/1/2024 2:42 PM EST  Workstation ID: [id number]         Assessment & Plan   Assessment & Plan       Difficulty with speech    History of Helicobacter pylori infection    History of breast cancer    Anemia, chronic disease    Asthma    History of DVT (deep vein thrombosis)    History of hysterectomy    History of pancreatitis    Hospital Course to date:  Jackelyn Diaz is a 63 y.o. female PMH asthma, anemia, hx of recurrent pancreatitis, hx of hormone receptor (+) breast cancer s/p left lumpectomy and s/p hysterectomy (2012), hx of mono (7/2022), food impaction s/p EGD where she was found (+) H pylori and wheat intolerance (2014) presenting acute onset of slurred speech that started 2 weeks ago that has worsened over the last 2 days.       Dysarthria  -consult neuro  -MRI no acute intracranial abn  -CTA head/neck no central  intracranial large vessel occlusion. Patent bilateral carotid arteries, patent bilateral vertebral arteries.  -NPO until swallow study  -SLP cj     Recent food impaction 10/2023  -s/p EGD with biopsy (+) Hpylori s/p treatment  -(+) wheat intolerance  -Protonix    hx of recurrent pancreatitis    hx of hormone receptor (+) breast cancer s/p left lumpectomy and s/p hysterectomy (2012)    hx of mono (7/2022)    Hx asthma    Hx of anemia    DVT prophylaxis:  SQH    CODE STATUS:    Level Of Support Discussed With: Patient  Code Status (Patient has no pulse and is not breathing): CPR (Attempt to Resuscitate)  Medical Interventions (Patient has pulse or is breathing): Full Support      Expected Discharge 01/03/2024      This note has been completed as part of a split-shared workflow.     Signature: Electronically signed by DUTCH Goncalves, 01/01/24, 5:06 PM EST.    Patient seen and examined.  Nonfocal exam except chronic asymmetric smile  THICK tongue with speech that is appropriate.   ? Allergic response?   No myasthenia or other systemic symptoms.  Bulbar deficit.  Look forward to neurology differential and diagnosis.  Agree with above  Medina Duenas MD 01/01/24 23:21 EST

## 2024-01-01 NOTE — CONSULTS
"Stroke Consult Note    Patient Name: Jackelyn Diaz   MRN: [id number]  Age: 63 y.o.  Sex: female  : 1960    Primary Care Physician: Vikas Jimenes PA  Referring Physician: Dr. Semaj Luke    TIME STROKE TEAM CALLED: 1252 EST     TIME PATIENT SEEN: 1253 EST    Handedness: Right  Race:     Chief Complaint/Reason for Consultation: Slurred speech    Subjective .  HPI:   Jackelyn Diaz is a 63-year-old  female with a past medical history significant for anemia, asthma, breast cancer, hyperlipidemia, and pancreatitis.  She presented to the emergency department this afternoon with complaints of slurred speech and tongue swelling.  She reports that all of her symptoms began approximately 2 weeks ago and that her slurred speech has worsened over the past 2 days.  She describes her tongue as feeling \"big\".  She does report that she did bite her tongue prior to all of her symptoms starting.  She also reports that she has had some difficulty with swallowing that she noted yesterday.  No other neurologic complaints.  NIH on my examination 1 for dysarthria.  She has been in her normal state of health and has been able to work since her symptoms started.  Examined the patient in triage with Dr. Luke.  Secondary to prolonged symptom onset she is appropriate for MRI brain and further workup and evaluation in the emergency department at this time.    Last Known Normal Date/Time: 2 weeks     Review of Systems   HENT:  Positive for trouble swallowing.    Musculoskeletal:  Negative for gait problem.   Neurological:  Positive for speech difficulty. Negative for dizziness, facial asymmetry, light-headedness, numbness and headaches.      Past Medical History:   Diagnosis Date    A-fib     Anemia     Asthma     Breast cancer 2012    LEFT    Gestational diabetes     Hx of radiation therapy 2012    LEFT BREAST CANCER    Hyperlipidemia     Kidney stone     Osteoarthritis     PONV (postoperative nausea " and vomiting)     Varicella      Past Surgical History:   Procedure Laterality Date    BREAST EXCISIONAL BIOPSY Right 2011    BREAST LUMPECTOMY Left 2012    HYSTEROSCOPY      TOTAL ABDOMINAL HYSTERECTOMY WITH SALPINGO OOPHORECTOMY      enlarged uterus due to tamoxifen reaction    WISDOM TOOTH EXTRACTION       Family History   Problem Relation Age of Onset    Diabetes Mother     Colon cancer Maternal Grandfather     Colon cancer Brother     Melanoma Sister     Breast cancer Neg Hx     Ovarian cancer Neg Hx      Social History     Socioeconomic History    Marital status:    Tobacco Use    Smoking status: Never    Smokeless tobacco: Never   Substance and Sexual Activity    Alcohol use: No    Drug use: Never    Sexual activity: Yes     Partners: Male     Birth control/protection: Post-menopausal     Allergies   Allergen Reactions    Red Dye Other (See Comments)     pancreatitis    Tamoxifen Itching    Povidone Iodine Rash     Prior to Admission medications    Medication Sig Start Date End Date Taking? Authorizing Provider   aspirin 81 MG chewable tablet Chew 81 mg Daily.    Johnna Estes MD   Clobetasol Propionate 0.05 % external spray  11/3/20   Johnna Estes MD   Cloderm 0.1 % cream  10/28/20   Johnna Estes MD   fluticasone (FLONASE) 50 MCG/ACT nasal spray 2 sprays into the nostril(s) as directed by provider Daily.    Johnna Estes MD   LORATADINE ALLERGY RELIEF PO Take  by mouth.    ProviderJohnna MD             Objective     Temp:  [97.8 °F (36.6 °C)] 97.8 °F (36.6 °C)  Heart Rate:  [88] 88  Resp:  [14] 14  BP: (157)/(91) 157/91    Neurological Exam  Mental Status  Alert. Oriented to person, place, time and situation. Oriented to person, place, and time. Moderate dysarthria present. Language is fluent with no aphasia. Fund of knowledge is appropriate for level of education.    Cranial Nerves  CN II: Visual fields full to confrontation.  CN III, IV, VI: Extraocular  movements intact bilaterally. Pupils equal round and reactive to light bilaterally.  CN V:  Right: Facial sensation is normal.  Left: Facial sensation is normal on the left.  CN VII:  Right: There is no facial weakness.  Left: There is no facial weakness.  CN VIII: Hearing appears to be intact bilaterally.  CN IX, X: Palate elevates symmetrically    Motor  Normal muscle bulk throughout. No fasciculations present. Normal muscle tone. Strength is 5/5 throughout all four extremities.    Sensory  Light touch is normal in upper and lower extremities.     Coordination  Right: Finger-to-nose normal.Left: Finger-to-nose normal.    Gait   Normal gait.  Not observed.      Physical Exam  Constitutional:       General: She is not in acute distress.     Appearance: Normal appearance.   HENT:      Head: Normocephalic.   Eyes:      Extraocular Movements: Extraocular movements intact.      Pupils: Pupils are equal, round, and reactive to light.   Pulmonary:      Effort: Pulmonary effort is normal.   Skin:     General: Skin is warm and dry.   Neurological:      Mental Status: She is alert and oriented to person, place, and time.      Cranial Nerves: Cranial nerve deficit and dysarthria present.      Sensory: No sensory deficit.      Motor: Motor strength is normal.No weakness.      Gait: Gait normal.   Psychiatric:         Mood and Affect: Mood normal.         Behavior: Behavior normal.         Thought Content: Thought content normal.         Judgment: Judgment normal.       Acute Stroke Data    IV Thrombolytic (TPA/Tenecteplase) Inclusion / Exclusion Criteria    Time: 13:03 EST  Person Administering Scale: DUTCH Jay    Inclusion Criteria  [x]   18 years of age or greater   []   Onset of symptoms < 4.5 hours before beginning treatment (stroke onset = time patient was last seen well or without symptoms).   []   Diagnosis of acute ischemic stroke causing measurable disabling deficit (Complete Hemianopia, Any Aphasia,  Visual or Sensory Extinction, Any weakness limiting sustained effort against gravity)   []   Any remaining deficit considered potentially disabling in view of patient and practitioner   Exclusion criteria (Do not proceed with Alteplase if any are checked under exclusion criteria)  [x]   Onset unknown or GREATER than 4.5 hours   []   ICH on CT/MRI   []   CT demonstrates hypodensity representing acute or subacute infarct   []   Significant head trauma or prior stroke in the previous 3 months   []   Symptoms suggestive of subarachnoid hemorrhage   []   History of un-ruptured intracranial aneurysm GREATER than 10 mm   []   Recent intracranial or intraspinal surgery within the last 3 months   []   Arterial puncture at a non-compressible site in the previous 7 days   []   Active internal bleeding   []   Acute bleeding tendency   []   Platelet count LESS than 100,000 for known hematological diseases such as leukemia, thrombocytopenia or chronic cirrhosis   []   Current use of anticoagulant with INR GREATER than 1.7 or PT GREATER than 15 seconds, aPTT GREATER than 40 seconds   []   Heparin received within 48 hours, resulting in abnormally elevated aPTT GREATER than upper limit of normal   []   Current use of direct thrombin inhibitors or direct factor Xa inhibitors in the past 48 hours   []   Elevated blood pressure refractory to treatment (systolic GREATER than 185 mm/Hg or diastolic  GREATER than 110 mm/Hg   []   Suspected infective endocarditis and aortic arch dissection   []   Current use of therapeutic treatment dose of low-molecular-weight heparin (LMWH) within the previous 24 hours   []   Structural GI malignancy or bleed   Relative exclusion for all patients  []   Only minor nondisabling symptoms   []   Pregnancy   []   Seizure at onset with postictal residual neurological impairments   []   Major surgery or previous trauma within past 14 days   []   History of previous spontaneous ICH, intracranial neoplasm, or AV  malformation   []   Postpartum (within previous 14 days)   []   Recent GI or urinary tract hemorrhage (within previous 21 days)   []   Recent acute MI (within previous 3 months)   []   History of unruptured intracranial aneurysm LESS than 10 mm   []   History of ruptured intracranial aneurysm   []   Blood glucose LESS than 50 mg/dL (2.7 mmol/L)   []   Dural puncture within the last 7 days   []   Known GREATER than 10 cerebral microbleeds   Additional exclusions for patients with symptoms onset between 3 and 4.5 hours.  []   Age > 80.   []   On any anticoagulants regardless of INR  >>> Warfarin (Coumadin), Heparin, Enoxaparin (Lovenox), fondaparinux (Arixtra), bivalirudin (Angiomax), Argatroban, dabigatran (Pradaxa), rivaroxaban (Xarelto), or apixaban (Eliquis)   []   Severe stroke (NIHSS > 25).   []   History of BOTH diabetes and previous ischemic stroke.   []   The risks and benefits have been discussed with the patient or family related to the administration of IV alteplase for stroke symptoms.   []   I have discussed and reviewed the patient's case and imaging with the attending prior to IV Thrombolytic (TPA/Tenecteplase).   Not applicable  Time Thrombolytic administered       Hospital Meds:  Scheduled-   Infusions- No current facility-administered medications for this encounter.     PRNs-     Functional Status Prior to Current Stroke/Ciales Score: 0    NIH Stroke Scale  Time: 13:03 EST  Person Administering Scale: DUTCH Jay    1a  Level of consciousness: 0=alert; keenly responsive   1b. LOC questions:  0=Performs both tasks correctly   1c. LOC commands: 0=Performs both tasks correctly   2.  Best Gaze: 0=normal   3.  Visual: 0=No visual loss   4. Facial Palsy: 0=Normal symmetric movement   5a.  Motor left arm: 0=No drift, limb holds 90 (or 45) degrees for full 10 seconds   5b.  Motor right arm: 0=No drift, limb holds 90 (or 45) degrees for full 10 seconds   6a. motor left le=No drift, limb holds  90 (or 45) degrees for full 10 seconds   6b  Motor right le=No drift, limb holds 90 (or 45) degrees for full 10 seconds   7. Limb Ataxia: 0=Absent   8.  Sensory: 0=Normal; no sensory loss   9. Best Language:  0=No aphasia, normal   10. Dysarthria: 1=Mild to moderate, patient slurs at least some words and at worst, can be understood with some difficulty   11. Extinction and Inattention: 0=No abnormality    Total:   1       Results Reviewed:  I have personally reviewed current lab, radiology, and data and agree with results.    MRI Brain Without Contrast    Result Date: 2024  MRI BRAIN WO CONTRAST Date of Exam: 2024 2:39 PM EST Indication: Neuro deficit, acute, stroke suspected stroke symptoms. Dysarthria  Comparison: CT angiography of the head and neck 2024 Technique:  Routine multiplanar/multisequence sequence images of the brain were obtained without contrast administration. Findings: No diffusion restriction to indicate acute ischemia. No mass effect or midline shift. No findings of intraparenchymal hemorrhage. No abnormal extra-axial fluid collection. Posterior fossa without acute abnormality. Partially empty sella. No suprasellar mass. Ventricles and cortical sulci are normally configured. The major T2 weighted intracranial vascular flow voids are patent, intracranial vasculature better assessed on the separately performed CT angiography. No cerebellar pontine angle mass. Normal appearance of the cerebellum. The mastoid air cells are well-aerated. No sinus fluid level. Globes intact. No retro-orbital abnormality. Normal calvarial marrow signal. Visualized portions of the upper cervical spine demonstrate multilevel disc desiccation with posterior disc osteophyte complexes contributing to upper cervical canal stenosis at C3-4 and C5-6 partially imaged.     Impression: Impression: 1. No acute intracranial abnormality, specifically negative for acute ischemia. 2. Chronic findings above.  Electronically Signed: Sánchez Beckwith MD  1/1/2024 3:11 PM EST  Workstation ID: [id number]     WBC   Date Value Ref Range Status   01/01/2024 5.03 3.40 - 10.80 10*3/mm3 Final     RBC   Date Value Ref Range Status   01/01/2024 5.18 3.77 - 5.28 10*6/mm3 Final     Hemoglobin   Date Value Ref Range Status   01/01/2024 15.8 12.0 - 15.9 g/dL Final     Hematocrit   Date Value Ref Range Status   01/01/2024 47.1 (H) 34.0 - 46.6 % Final     MCV   Date Value Ref Range Status   01/01/2024 90.9 79.0 - 97.0 fL Final     MCH   Date Value Ref Range Status   01/01/2024 30.5 26.6 - 33.0 pg Final     MCHC   Date Value Ref Range Status   01/01/2024 33.5 31.5 - 35.7 g/dL Final     RDW   Date Value Ref Range Status   01/01/2024 11.9 (L) 12.3 - 15.4 % Final     RDW-SD   Date Value Ref Range Status   01/01/2024 39.2 37.0 - 54.0 fl Final     MPV   Date Value Ref Range Status   01/01/2024 8.6 6.0 - 12.0 fL Final     Platelets   Date Value Ref Range Status   01/01/2024 331 140 - 450 10*3/mm3 Final     Neutrophil %   Date Value Ref Range Status   01/01/2024 52.6 42.7 - 76.0 % Final     Lymphocyte %   Date Value Ref Range Status   01/01/2024 37.0 19.6 - 45.3 % Final     Monocyte %   Date Value Ref Range Status   01/01/2024 6.6 5.0 - 12.0 % Final     Eosinophil %   Date Value Ref Range Status   01/01/2024 2.8 0.3 - 6.2 % Final     Basophil %   Date Value Ref Range Status   01/01/2024 0.8 0.0 - 1.5 % Final     Immature Grans %   Date Value Ref Range Status   01/01/2024 0.2 0.0 - 0.5 % Final     Neutrophils, Absolute   Date Value Ref Range Status   01/01/2024 2.65 1.70 - 7.00 10*3/mm3 Final     Lymphocytes, Absolute   Date Value Ref Range Status   01/01/2024 1.86 0.70 - 3.10 10*3/mm3 Final     Monocytes, Absolute   Date Value Ref Range Status   01/01/2024 0.33 0.10 - 0.90 10*3/mm3 Final     Eosinophils, Absolute   Date Value Ref Range Status   01/01/2024 0.14 0.00 - 0.40 10*3/mm3 Final     Basophils, Absolute   Date Value Ref Range Status   01/01/2024  0.04 0.00 - 0.20 10*3/mm3 Final     Immature Grans, Absolute   Date Value Ref Range Status   01/01/2024 0.01 0.00 - 0.05 10*3/mm3 Final     nRBC   Date Value Ref Range Status   01/01/2024 0.0 0.0 - 0.2 /100 WBC Final     Lab Results   Component Value Date    GLUCOSE 86 01/01/2024    BUN 10 01/01/2024    CREATININE 0.98 01/01/2024    EGFR 65.8 01/01/2024    BCR 10.2 01/01/2024    K 3.9 01/01/2024    CO2 28.0 01/01/2024    CALCIUM 9.4 01/01/2024    ALBUMIN 4.4 01/01/2024    BILITOT 0.4 01/01/2024    AST 24 01/01/2024    ALT 19 01/01/2024       Assessment/Plan:  63-year-old female with vascular risk factors who presented to [facility] emergency department with 2 weeks complaint of slurred speech that has worsened over the past 2 days and now includes difficulty with swallowing.  She is not deemed to be an appropriate emergent intervention candidate secondary to extended last known well.    Antiplatelet PTA: None  Anticoagulant PTA: None      Dysarthria, difficulty with swallowing  -Stat MRI brain in ED  -Vessel imaging per Dr. Luke  -Bedside dysphagia screening prior to any p.o. intake including medications  -Neurochecks    Pending MRI results patient will be admitted to [facility] for further workup and evaluation.  If MRI brain is negative for any acute infarct general neurology to follow the patient.  Plan of care discussed with Dr. Tsai as well as Dr. Luke.  Thanks for the consult and care of this patient.  Please call with any further questions or concerns.      Carlotta Hopper, APRN  January 1, 2024  13:03 EST

## 2024-01-01 NOTE — Clinical Note
Level of Care: Telemetry [5]   Diagnosis: Difficulty with speech [[id number]]   Admitting Physician: CAMPBELL MAYER [[id number]]   Attending Physician: CAMPBELL MAYER [[id number]]

## 2024-01-01 NOTE — ED PROVIDER NOTES
EMERGENCY DEPARTMENT ENCOUNTER    Pt Name: Jackelyn Diaz  MRN: [id number]  Pt :   1960  Room Number:  S320/1  Date of encounter:  2024  PCP: Vikas Jimenes PA  ED Provider: Semaj Luke MD    Historian: Patient,       HPI:  Chief Complaint: Slurred speech        Context: Jackelyn Diaz is a 63-year-old woman who presents because of slurred speech.  She says she initially started developing this 2 weeks ago she had bit her tongue at the time and thought was related to that.  Then at exactly 8 PM last night she had significant worsening of this.  She has been having trouble getting her words out and has the sensation that her tongue is swollen but does not appreciate any actual swelling.  She had some episodes of numbness around her mouth as well.  That has resolved.  She continues to have the speech difficulty.  No appreciated fevers or systemic symptoms.  No other complaints at this time.       PAST MEDICAL HISTORY  Past Medical History:   Diagnosis Date    A-fib     Anemia     Asthma     Breast cancer 2012    LEFT    Gestational diabetes     Hx of radiation therapy 2012    LEFT BREAST CANCER    Hyperlipidemia     Kidney stone     Osteoarthritis     PONV (postoperative nausea and vomiting)     Varicella          PAST SURGICAL HISTORY  Past Surgical History:   Procedure Laterality Date    BREAST EXCISIONAL BIOPSY Right     BREAST LUMPECTOMY Left     HYSTEROSCOPY      TOTAL ABDOMINAL HYSTERECTOMY WITH SALPINGO OOPHORECTOMY      enlarged uterus due to tamoxifen reaction    WISDOM TOOTH EXTRACTION           FAMILY HISTORY  Family History   Problem Relation Age of Onset    Diabetes Mother     Colon cancer Maternal Grandfather     Colon cancer Brother     Melanoma Sister     Breast cancer Neg Hx     Ovarian cancer Neg Hx          SOCIAL HISTORY  Social History     Socioeconomic History    Marital status:    Tobacco Use    Smoking status: Never    Smokeless tobacco: Never    Vaping Use    Vaping Use: Never used   Substance and Sexual Activity    Alcohol use: No    Drug use: Never    Sexual activity: Yes     Partners: Male     Birth control/protection: Post-menopausal         ALLERGIES  Red dye, Tamoxifen, Wheat, and Povidone iodine        REVIEW OF SYSTEMS  Review of Systems       All systems reviewed and negative except for those discussed in HPI.       PHYSICAL EXAM    I have reviewed the triage vital signs and nursing notes.    ED Triage Vitals [01/01/24 1241]   Temp Heart Rate Resp BP SpO2   97.8 °F (36.6 °C) 88 14 157/91 98 %      Temp src Heart Rate Source Patient Position BP Location FiO2 (%)   Oral Monitor Sitting Right arm --       Physical Exam  GENERAL:   Appears in no acute distress.   HENT: Nares patent.  Slight facial asymmetry at the left corner of the mouth patient reports this is chronic, despite sensation of tongue fullness I do not appreciate any swelling, macroglossia, erythema, or inflammation, soft underneath the tongue.  EYES: No scleral icterus.  CV: Regular rhythm, regular rate.  RESPIRATORY: Normal effort.  No audible wheezes, rales or rhonchi.  ABDOMEN: Soft, nontender  MUSCULOSKELETAL: No deformities.   NEURO: Alert, moves all extremities, follows commands.  SKIN: Warm, dry, no rash visualized.      LAB RESULTS  Recent Results (from the past 24 hour(s))   Comprehensive Metabolic Panel    Collection Time: 01/01/24  1:12 PM    Specimen: Blood   Result Value Ref Range    Glucose 86 65 - 99 mg/dL    BUN 10 8 - 23 mg/dL    Creatinine 0.98 0.57 - 1.00 mg/dL    Sodium 143 136 - 145 mmol/L    Potassium 3.9 3.5 - 5.2 mmol/L    Chloride 105 98 - 107 mmol/L    CO2 28.0 22.0 - 29.0 mmol/L    Calcium 9.4 8.6 - 10.5 mg/dL    Total Protein 7.5 6.0 - 8.5 g/dL    Albumin 4.4 3.5 - 5.2 g/dL    ALT (SGPT) 19 1 - 33 U/L    AST (SGOT) 24 1 - 32 U/L    Alkaline Phosphatase 83 39 - 117 U/L    Total Bilirubin 0.4 0.0 - 1.2 mg/dL    Globulin 3.1 gm/dL    A/G Ratio 1.4 g/dL     BUN/Creatinine Ratio 10.2 7.0 - 25.0    Anion Gap 10.0 5.0 - 15.0 mmol/L    eGFR 65.8 >60.0 mL/min/1.73   Protime-INR    Collection Time: 01/01/24  1:12 PM    Specimen: Blood   Result Value Ref Range    Protime 12.3 12.2 - 14.5 Seconds    INR 0.90 0.89 - 1.12   aPTT    Collection Time: 01/01/24  1:12 PM    Specimen: Blood   Result Value Ref Range    PTT 27.7 (L) 60.0 - 90.0 seconds   Single High Sensitivity Troponin T    Collection Time: 01/01/24  1:12 PM    Specimen: Blood   Result Value Ref Range    HS Troponin T 8 <14 ng/L   Lactic Acid, Plasma    Collection Time: 01/01/24  1:12 PM    Specimen: Blood   Result Value Ref Range    Lactate 1.3 0.5 - 2.0 mmol/L   Magnesium    Collection Time: 01/01/24  1:12 PM    Specimen: Blood   Result Value Ref Range    Magnesium 2.2 1.6 - 2.4 mg/dL   Phosphorus    Collection Time: 01/01/24  1:12 PM    Specimen: Blood   Result Value Ref Range    Phosphorus 3.3 2.5 - 4.5 mg/dL   TSH    Collection Time: 01/01/24  1:12 PM    Specimen: Blood   Result Value Ref Range    TSH 1.990 0.270 - 4.200 uIU/mL   T4, Free    Collection Time: 01/01/24  1:12 PM    Specimen: Blood   Result Value Ref Range    Free T4 1.40 0.93 - 1.70 ng/dL   CBC Auto Differential    Collection Time: 01/01/24  1:12 PM    Specimen: Blood   Result Value Ref Range    WBC 5.03 3.40 - 10.80 10*3/mm3    RBC 5.18 3.77 - 5.28 10*6/mm3    Hemoglobin 15.8 12.0 - 15.9 g/dL    Hematocrit 47.1 (H) 34.0 - 46.6 %    MCV 90.9 79.0 - 97.0 fL    MCH 30.5 26.6 - 33.0 pg    MCHC 33.5 31.5 - 35.7 g/dL    RDW 11.9 (L) 12.3 - 15.4 %    RDW-SD 39.2 37.0 - 54.0 fl    MPV 8.6 6.0 - 12.0 fL    Platelets 331 140 - 450 10*3/mm3    Neutrophil % 52.6 42.7 - 76.0 %    Lymphocyte % 37.0 19.6 - 45.3 %    Monocyte % 6.6 5.0 - 12.0 %    Eosinophil % 2.8 0.3 - 6.2 %    Basophil % 0.8 0.0 - 1.5 %    Immature Grans % 0.2 0.0 - 0.5 %    Neutrophils, Absolute 2.65 1.70 - 7.00 10*3/mm3    Lymphocytes, Absolute 1.86 0.70 - 3.10 10*3/mm3    Monocytes, Absolute  0.33 0.10 - 0.90 10*3/mm3    Eosinophils, Absolute 0.14 0.00 - 0.40 10*3/mm3    Basophils, Absolute 0.04 0.00 - 0.20 10*3/mm3    Immature Grans, Absolute 0.01 0.00 - 0.05 10*3/mm3    nRBC 0.0 0.0 - 0.2 /100 WBC   Urinalysis With Microscopic If Indicated (No Culture) - Urine, Clean Catch    Collection Time: 01/01/24  1:13 PM    Specimen: Urine, Clean Catch   Result Value Ref Range    Color, UA Yellow Yellow, Straw    Appearance, UA Clear Clear    pH, UA 7.0 5.0 - 8.0    Specific Gravity, UA 1.008 1.001 - 1.030    Glucose, UA Negative Negative    Ketones, UA Negative Negative    Bilirubin, UA Negative Negative    Blood, UA Negative Negative    Protein, UA Negative Negative    Leuk Esterase, UA Trace (A) Negative    Nitrite, UA Negative Negative    Urobilinogen, UA 0.2 E.U./dL 0.2 - 1.0 E.U./dL   Urinalysis, Microscopic Only - Urine, Clean Catch    Collection Time: 01/01/24  1:13 PM    Specimen: Urine, Clean Catch   Result Value Ref Range    RBC, UA 0-2 None Seen, 0-2 /HPF    WBC, UA 0-2 None Seen, 0-2 /HPF    Bacteria, UA None Seen None Seen, Trace /HPF    Squamous Epithelial Cells, UA None Seen None Seen, 0-2 /HPF    Hyaline Casts, UA None Seen 0 - 6 /LPF    Methodology Automated Microscopy    ECG 12 Lead Stroke Evaluation    Collection Time: 01/01/24  1:24 PM   Result Value Ref Range    QT Interval 396 ms    QTC Interval 415 ms       If labs were ordered, I independently reviewed the results and considered them in treating the patient.        RADIOLOGY  MRI Brain Without Contrast    Result Date: 1/1/2024  MRI BRAIN WO CONTRAST Date of Exam: 1/1/2024 2:39 PM EST Indication: Neuro deficit, acute, stroke suspected stroke symptoms. Dysarthria  Comparison: CT angiography of the head and neck 1/1/2024 Technique:  Routine multiplanar/multisequence sequence images of the brain were obtained without contrast administration. Findings: No diffusion restriction to indicate acute ischemia. No mass effect or midline shift. No  findings of intraparenchymal hemorrhage. No abnormal extra-axial fluid collection. Posterior fossa without acute abnormality. Partially empty sella. No suprasellar mass. Ventricles and cortical sulci are normally configured. The major T2 weighted intracranial vascular flow voids are patent, intracranial vasculature better assessed on the separately performed CT angiography. No cerebellar pontine angle mass. Normal appearance of the cerebellum. The mastoid air cells are well-aerated. No sinus fluid level. Globes intact. No retro-orbital abnormality. Normal calvarial marrow signal. Visualized portions of the upper cervical spine demonstrate multilevel disc desiccation with posterior disc osteophyte complexes contributing to upper cervical canal stenosis at C3-4 and C5-6 partially imaged.     Impression: 1. No acute intracranial abnormality, specifically negative for acute ischemia. 2. Chronic findings above. Electronically Signed: Sánchez Beckwith MD  1/1/2024 3:11 PM EST  Workstation ID: [id number]    CT Angiogram Head w AI Analysis of LVO    Result Date: 1/1/2024  CT ANGIOGRAM HEAD W AI ANALYSIS OF LVO, CT ANGIOGRAM NECK Date of Exam: 1/1/2024 1:53 PM EST Indication: progressively worsening slurred speech 2 weeks,. Comparison: None available. Technique: CTA of the head and neck was performed after the uneventful intravenous administration of 75 mL Isovue-370. Reconstructed coronal and sagittal images were also obtained. In addition, a 3-D volume rendered image was created for interpretation. Automated exposure control and iterative reconstruction methods were used. Findings: Neck: Lung apices are clear. Visualized pulmonary arteries are normally opacified with contrast. The thoracic aortic arch branch vessels are patent. The left common, internal, and external carotid arteries are patent. Negative for left carotid stenosis. The distal left ICA is patent to the [place]. The right common carotid artery is patent  with angulated, tortuous course proximally, similar to prior chest CT. There is streak artifact from contrast bolus timing in the right subclavian vein which partially limits assessment of the proximal right common carotid artery. The right internal and external carotid arteries are patent. Negative for right-sided carotid stenosis. The distal right ICA is patent to the [place]. The left vertebral artery is dominant and patent throughout its course. The right vertebral artery is patent. No acute soft tissue abnormality in the neck. Internal jugular veins are patent within limits of contrast timing. Streak artifact from dental amalgam limits oral pharyngeal assessment. No acute abnormality of the aerodigestive soft tissues. Scattered cervical chain lymph nodes are below size criteria. Multilevel disc narrowing in the cervical spine. No aggressive osseous lesion or acute fracture. Head: Distal internal carotid arteries are patent. Both anterior cerebral arteries are patent. Anterior communicating artery patent. The left middle cerebral artery is patent at the M1 and visualized proximal M2 segments. The right middle cerebral artery is patent at the M1 and visualized proximal M2 segments. The basilar artery is patent. Bilateral superior cerebellar arteries are patent. Fetal origin of the right posterior cerebral artery. Predominant fetal origin of the left PCA. Both posterior cerebral arteries are patent. Basilar artery is patent. The bilateral superior cerebellar arteries are patent. No significant intracranial aneurysm. Within limits of contrast timing the major intracranial venous sinuses are patent. Negative for midline shift or mass effect. No findings of intracranial hemorrhage. Posterior fossa without acute abnormality. The globes are symmetric. The mastoid air cells are well-aerated. Negative for sinus fluid level.     Impression: 1. No central intracranial large vessel occlusion. 2. Patent bilateral  carotid arteries. 3. Patent bilateral vertebral arteries. Electronically Signed: Sánchez Beckwith MD  1/1/2024 2:42 PM EST  Workstation ID: [id number]    CT Angiogram Neck    Result Date: 1/1/2024  CT ANGIOGRAM HEAD W AI ANALYSIS OF LVO, CT ANGIOGRAM NECK Date of Exam: 1/1/2024 1:53 PM EST Indication: progressively worsening slurred speech 2 weeks,. Comparison: None available. Technique: CTA of the head and neck was performed after the uneventful intravenous administration of 75 mL Isovue-370. Reconstructed coronal and sagittal images were also obtained. In addition, a 3-D volume rendered image was created for interpretation. Automated exposure control and iterative reconstruction methods were used. Findings: Neck: Lung apices are clear. Visualized pulmonary arteries are normally opacified with contrast. The thoracic aortic arch branch vessels are patent. The left common, internal, and external carotid arteries are patent. Negative for left carotid stenosis. The distal left ICA is patent to the [place]. The right common carotid artery is patent with angulated, tortuous course proximally, similar to prior chest CT. There is streak artifact from contrast bolus timing in the right subclavian vein which partially limits assessment of the proximal right common carotid artery. The right internal and external carotid arteries are patent. Negative for right-sided carotid stenosis. The distal right ICA is patent to the [place]. The left vertebral artery is dominant and patent throughout its course. The right vertebral artery is patent. No acute soft tissue abnormality in the neck. Internal jugular veins are patent within limits of contrast timing. Streak artifact from dental amalgam limits oral pharyngeal assessment. No acute abnormality of the aerodigestive soft tissues. Scattered cervical chain lymph nodes are below size criteria. Multilevel disc narrowing in the cervical spine. No aggressive osseous lesion or  acute fracture. Head: Distal internal carotid arteries are patent. Both anterior cerebral arteries are patent. Anterior communicating artery patent. The left middle cerebral artery is patent at the M1 and visualized proximal M2 segments. The right middle cerebral artery is patent at the M1 and visualized proximal M2 segments. The basilar artery is patent. Bilateral superior cerebellar arteries are patent. Fetal origin of the right posterior cerebral artery. Predominant fetal origin of the left PCA. Both posterior cerebral arteries are patent. Basilar artery is patent. The bilateral superior cerebellar arteries are patent. No significant intracranial aneurysm. Within limits of contrast timing the major intracranial venous sinuses are patent. Negative for midline shift or mass effect. No findings of intracranial hemorrhage. Posterior fossa without acute abnormality. The globes are symmetric. The mastoid air cells are well-aerated. Negative for sinus fluid level.     Impression: 1. No central intracranial large vessel occlusion. 2. Patent bilateral carotid arteries. 3. Patent bilateral vertebral arteries. Electronically Signed: Sánchez Beckwith MD  1/1/2024 2:42 PM EST  Workstation ID: [id number]     I ordered and independently reviewed the above noted radiographic studies.      I viewed images of CTAs of the head and neck which did not show any acute vascular abnormalities that I can appreciate and likewise do not show any oropharyngeal swelling masses or other abnormalities that I can appreciate.  RI of the brain which does not show any acute bleeds masses or other pathology that I can appreciate on my independent interpretation.    See radiologist's dictation for official interpretation.        PROCEDURES    Procedures    ECG 12 Lead Stroke Evaluation   Preliminary Result   Test Reason : Stroke Evaluation   Blood Pressure :   */*   mmHG   Vent. Rate :  66 BPM     Atrial Rate :  66 BPM      P-R Int : 124 ms          QRS  Dur :  88 ms       QT Int : 396 ms       P-R-T Axes :  25 -10  17 degrees      QTc Int : 415 ms      Normal sinus rhythm   Minimal voltage criteria for LVH, may be normal variant   Borderline ECG   When compared with ECG of 08-AUG-2022 12:49,   Vent. rate has decreased BY  53 BPM   Nonspecific T wave abnormality no longer evident in Anterior leads      Referred By: EDMD           Confirmed By:           MEDICATIONS GIVEN IN ER    Medications   sodium chloride 0.9 % flush 10 mL (10 mL Intravenous Given 1/1/24 2127)   sodium chloride 0.9 % flush 10 mL (has no administration in time range)   sodium chloride 0.9 % infusion 40 mL (has no administration in time range)   polyethylene glycol (MIRALAX) packet 17 g (has no administration in time range)     And   bisacodyl (DULCOLAX) EC tablet 5 mg (has no administration in time range)     And   bisacodyl (DULCOLAX) suppository 10 mg (has no administration in time range)   heparin (porcine) 5000 UNIT/ML injection 5,000 Units (5,000 Units Subcutaneous Given 1/1/24 2124)   nitroglycerin (NITROSTAT) SL tablet 0.4 mg (has no administration in time range)   aspirin chewable tablet 81 mg (has no administration in time range)   fluticasone (FLONASE) 50 MCG/ACT nasal spray 2 spray (has no administration in time range)   pantoprazole (PROTONIX) EC tablet 40 mg (has no administration in time range)   sodium chloride 0.9 % bolus 1,000 mL (0 mL Intravenous Stopped 1/1/24 1815)   iopamidol (ISOVUE-370) 76 % injection 75 mL (75 mL Intravenous Given 1/1/24 1401)         MEDICAL DECISION MAKING, PROGRESS, and CONSULTS    All labs, if obtained, have been independently reviewed by me.  All radiology studies, if obtained, have been reviewed by me and the radiologist dictating the report.  All EKG's, if obtained, have been independently viewed and interpreted by me/my attending physician.      Discussion below represents my analysis of pertinent findings related to patient's condition,  differential diagnosis, treatment plan and final disposition.                         Differential diagnosis:    Kary's angina, retropharyngeal infection, peritonsillar abscess, ischemic stroke, hemorrhagic stroke, brain mass, sepsis, anemia, electrolyte abnormality      Additional sources:    - Discussed/ obtained information from independent historians: Spouse    - External (non-ED) record review: Previous hospitalization for pancreatitis shows history of recurrent episodes of pancreatitis and history of breast cancer    - Chronic or social conditions impacting care: History of malignancy    - Shared decision making: Agreeable to hospital admission      Orders placed during this visit:  Orders Placed This Encounter   Procedures    CT Angiogram Head w AI Analysis of LVO    CT Angiogram Neck    MRI Brain Without Contrast    Comprehensive Metabolic Panel    Protime-INR    aPTT    Urinalysis With Microscopic If Indicated (No Culture) - Urine, Clean Catch    Single High Sensitivity Troponin T    Lactic Acid, Plasma    Magnesium    Phosphorus    TSH    T4, Free    CBC Auto Differential    Urinalysis, Microscopic Only - Urine, Clean Catch    CBC Auto Differential    Basic Metabolic Panel    Diet: Regular/House Diet; Texture: Regular Texture (IDDSI 7); Fluid Consistency: Thin (IDDSI 0)    Vital Signs    Intake & Output    Weigh Patient    Oral Care    Telemetry - Maintain IV Access    Telemetry - Place Orders & Notify Provider of Results When Patient Experiences Acute Chest Pain, Dysrhythmia or Respiratory Distress    May Be Off Telemetry for Tests    Pulse Oximetry, Continuous    Up in Chair    Up With Assistance    Notify Provider (With Default Parameters)    Nursing Dysphagia Screening (Complete Prior to Giving Anything By Mouth)    Code Status and Medical Interventions:    Inpatient Neurology Consult General    Incentive Spirometry    Oxygen Therapy- Nasal Cannula; Titrate 1-6 LPM Per SpO2; 90 - 95%    SLP Consult:  Eval & Treat Communication Disorder, Swallow Disorder    SLP Consult: Eval & Treat Communication Disorder    ECG 12 Lead Stroke Evaluation    Insert Peripheral IV    Remove Peripheral IV in Place & Restart New IV, As Soon As Patient Condition Allows    Initiate Observation Status    ED IP Bed Request    CBC & Differential         Additional orders considered but not ordered:      ED Course:    Consultants:      ED Course as of 01/01/24 2144   Mon Jan 01, 2024   1250 In summary this is a 63-year-old woman who presents because of slurred speech.  She says she initially started developing this 2 weeks ago she had bit her tongue at the time and thought was related to that.  Then at exactly 8 PM last night she had significant worsening of this.  She has been having trouble getting her words out and has the sensation that her tongue is swollen but does not appreciate any actual swelling.  She had some episodes of numbness around her mouth as well.  That has resolved.  She continues to have the speech difficulty.  No appreciated fevers or systemic symptoms.  No other complaints at this time. [CC]   1251 Patient was evaluated in triage the was last known normal is somewhat unclear but it sounds like symptoms of 2 weeks but she is describing an acute change at 8 PM I made her a stroke alert stroke team has been notified and is recommending progressing directly to MRI.  She has a sensation of tongue swelling she is not on any medications that should cause this and I do not appreciate any significant swelling on my exam.  She is soft underneath the tongue.  With that said obtaining CT soft tissue neck to evaluate for any deep space infections.  She does have what appears to be mild facial droop on the left she says she has always been that way and does not think her facial symmetry has changed any. [CC]   1404 CBC and CMP reassuring and nonactionable.  No elevation in high-sensitivity troponin  Normal thyroid function.  Normal  magnesium and lactic acid.  Urinalysis is clean. [CC]   1453 CTAs of the head and neck fortunately did not show any acute stenosis or vascular injury likewise do not show any evidence of swelling or infection in the oropharynx. [CC]   1531 MRI of the brain fortunately does not show any acute masses strokes or other pathology that I can appreciate.  Neurostroke team does not think this is a stroke but is recommending hospital admission for general neurology workup for other pathology and swallow study.  Discussed this with the patient she is not thrilled about it but is agreeable to hospital admission for further workup.  Medicine team consulted for admission. [CC]      ED Course User Index  [CC] Semaj Luke MD              Shared Decision Making:  After my consideration of clinical presentation and any laboratory/radiology studies obtained, I discussed the findings with the patient/patient representative who is in agreement with the treatment plan and the final disposition.   Risks and benefits of discharge and/or observation/admission were discussed.       AS OF 21:44 EST VITALS:    BP - 135/84  HR - 62  TEMP - 97.8 °F (36.6 °C) (Oral)  O2 SATS - 99%                  DIAGNOSIS  Final diagnoses:   Slurred speech   Oropharyngeal dysphagia   Hyperlipidemia, unspecified hyperlipidemia type         DISPOSITION  Admit      Please note that portions of this document were completed with voice recognition software.        Semaj Luke MD  01/01/24 7402

## 2024-01-02 VITALS
HEART RATE: 82 BPM | TEMPERATURE: 97.9 F | OXYGEN SATURATION: 97 % | HEIGHT: 63 IN | BODY MASS INDEX: 27.45 KG/M2 | SYSTOLIC BLOOD PRESSURE: 128 MMHG | DIASTOLIC BLOOD PRESSURE: 73 MMHG | RESPIRATION RATE: 17 BRPM | WEIGHT: 154.9 LBS

## 2024-01-02 LAB
ANION GAP SERPL CALCULATED.3IONS-SCNC: 9 MMOL/L (ref 5–15)
BASOPHILS # BLD AUTO: 0.04 10*3/MM3 (ref 0–0.2)
BASOPHILS NFR BLD AUTO: 0.8 % (ref 0–1.5)
BUN SERPL-MCNC: 12 MG/DL (ref 8–23)
BUN/CREAT SERPL: 13 (ref 7–25)
CALCIUM SPEC-SCNC: 8.8 MG/DL (ref 8.6–10.5)
CHLORIDE SERPL-SCNC: 106 MMOL/L (ref 98–107)
CO2 SERPL-SCNC: 26 MMOL/L (ref 22–29)
CREAT SERPL-MCNC: 0.92 MG/DL (ref 0.57–1)
DEPRECATED RDW RBC AUTO: 37.5 FL (ref 37–54)
EGFRCR SERPLBLD CKD-EPI 2021: 71 ML/MIN/1.73
EOSINOPHIL # BLD AUTO: 0.21 10*3/MM3 (ref 0–0.4)
EOSINOPHIL NFR BLD AUTO: 4 % (ref 0.3–6.2)
ERYTHROCYTE [DISTWIDTH] IN BLOOD BY AUTOMATED COUNT: 11.7 % (ref 12.3–15.4)
FERRITIN SERPL-MCNC: 92.43 NG/ML (ref 13–150)
GLUCOSE SERPL-MCNC: 93 MG/DL (ref 65–99)
HCT VFR BLD AUTO: 40.2 % (ref 34–46.6)
HGB BLD-MCNC: 13.8 G/DL (ref 12–15.9)
IMM GRANULOCYTES # BLD AUTO: 0.01 10*3/MM3 (ref 0–0.05)
IMM GRANULOCYTES NFR BLD AUTO: 0.2 % (ref 0–0.5)
LYMPHOCYTES # BLD AUTO: 2.02 10*3/MM3 (ref 0.7–3.1)
LYMPHOCYTES NFR BLD AUTO: 38.1 % (ref 19.6–45.3)
MCH RBC QN AUTO: 30.2 PG (ref 26.6–33)
MCHC RBC AUTO-ENTMCNC: 34.3 G/DL (ref 31.5–35.7)
MCV RBC AUTO: 88 FL (ref 79–97)
MONOCYTES # BLD AUTO: 0.38 10*3/MM3 (ref 0.1–0.9)
MONOCYTES NFR BLD AUTO: 7.2 % (ref 5–12)
NEUTROPHILS NFR BLD AUTO: 2.64 10*3/MM3 (ref 1.7–7)
NEUTROPHILS NFR BLD AUTO: 49.7 % (ref 42.7–76)
NRBC BLD AUTO-RTO: 0 /100 WBC (ref 0–0.2)
PLATELET # BLD AUTO: 293 10*3/MM3 (ref 140–450)
PMV BLD AUTO: 8.8 FL (ref 6–12)
POTASSIUM SERPL-SCNC: 3.9 MMOL/L (ref 3.5–5.2)
RBC # BLD AUTO: 4.57 10*6/MM3 (ref 3.77–5.28)
SODIUM SERPL-SCNC: 141 MMOL/L (ref 136–145)
VIT B12 BLD-MCNC: 292 PG/ML (ref 211–946)
WBC NRBC COR # BLD AUTO: 5.3 10*3/MM3 (ref 3.4–10.8)

## 2024-01-02 PROCEDURE — 92610 EVALUATE SWALLOWING FUNCTION: CPT

## 2024-01-02 PROCEDURE — 80048 BASIC METABOLIC PNL TOTAL CA: CPT | Performed by: NURSE PRACTITIONER

## 2024-01-02 PROCEDURE — 85025 COMPLETE CBC W/AUTO DIFF WBC: CPT | Performed by: NURSE PRACTITIONER

## 2024-01-02 PROCEDURE — 82785 ASSAY OF IGE: CPT | Performed by: INTERNAL MEDICINE

## 2024-01-02 PROCEDURE — G0378 HOSPITAL OBSERVATION PER HR: HCPCS

## 2024-01-02 PROCEDURE — 82728 ASSAY OF FERRITIN: CPT | Performed by: INTERNAL MEDICINE

## 2024-01-02 PROCEDURE — 99214 OFFICE O/P EST MOD 30 MIN: CPT

## 2024-01-02 PROCEDURE — 99239 HOSP IP/OBS DSCHRG MGMT >30: CPT | Performed by: INTERNAL MEDICINE

## 2024-01-02 PROCEDURE — 96372 THER/PROPH/DIAG INJ SC/IM: CPT

## 2024-01-02 PROCEDURE — 82607 VITAMIN B-12: CPT | Performed by: INTERNAL MEDICINE

## 2024-01-02 PROCEDURE — 25010000002 HEPARIN (PORCINE) PER 1000 UNITS: Performed by: NURSE PRACTITIONER

## 2024-01-02 PROCEDURE — 92523 SPEECH SOUND LANG COMPREHEN: CPT

## 2024-01-02 RX ADMIN — Medication 10 ML: at 08:00

## 2024-01-02 RX ADMIN — ASPIRIN 81 MG: 81 TABLET, CHEWABLE ORAL at 08:00

## 2024-01-02 RX ADMIN — HEPARIN SODIUM 5000 UNITS: 5000 INJECTION INTRAVENOUS; SUBCUTANEOUS at 06:28

## 2024-01-02 RX ADMIN — PANTOPRAZOLE SODIUM 40 MG: 40 TABLET, DELAYED RELEASE ORAL at 06:28

## 2024-01-02 NOTE — PROGRESS NOTES
[facility] Neurology    Progress Note    Patient Name: Jackelyn Diaz  : 1960  MRN: [id number]  Primary Care Physician:  Vikas Jimenes PA  Date of admission: 2024    Subjective     Chief Complaint: Difficulty swallowing, dysarthria    History of Present Illness   Patient was seen resting comfortably in bed.  She denies any issues with swallowing or respirations at this time.  She is alert and oriented x 4.      She states that she has not had an event like this however has had a similar incident when ate garlic.  Subsequently finding out she was allergic.  She states at that time she was given an IM injection of steroids.    Review of Systems   General: Negative for fever, nausea, or vomiting.   Neurological: Negative for headache, pain, or weakness.     Objective     Physical Exam  Vitals and nursing note reviewed.   Constitutional:       General: She is not in acute distress.     Appearance: She is not ill-appearing.   HENT:      Mouth/Throat:      Comments: Tongue not protruding against teeth or out of mouth.  No teeth indentation noted on tongue.   Eyes:      Extraocular Movements: Extraocular movements intact.      Pupils: Pupils are equal, round, and reactive to light.      Comments: No nystagmus noted.  No ptosis noted   Cardiovascular:      Rate and Rhythm: Normal rate.   Pulmonary:      Effort: Pulmonary effort is normal.      Breath sounds: Normal breath sounds and air entry.   Neurological:      Mental Status: She is alert and oriented to person, place, and time.      Cranial Nerves: Cranial nerves 2-12 are intact.      Sensory: Sensation is intact.      Motor: Motor function is intact. No weakness.      Coordination: Coordination is intact.      Deep Tendon Reflexes: Reflexes are normal and symmetric. Babinski sign absent on the right side. Babinski sign absent on the left side.      Reflex Scores:       Bicep reflexes are 2+ on the right side and 2+ on the left side.        Patellar reflexes are 2+ on the right side and 2+ on the left side.     Comments:     Cranial Nerves   CN II: Pupils are equal, round, and reactive to light. Normal visual acuity and visual fields.    CN III IV VI: Extraocular movements are full without nystagmus.  CN V: Normal facial sensation and strength of muscles of mastication.  CN VII: Facial movements are symmetric. No weakness.  CN VIII:  Auditory acuity is normal.  CN IX & X:  Symmetric palatal movement.  CN XI: Sternocleidomastoid and trapezius are normal.  No weakness.  CN XII: The tongue is midline.  No atrophy or fasciculations.    Motor:   Neck extension strength 5/5   Neck Flexion 5/5  Sternocleidomastoid 5/5  Bilateral eyelid strength 5/5  Bilateral trapezius strength /55   Bilateral tricep strength 5/5  Bilateral bicep strength 5 /5  Bilateral wrist flexion strength wrist 5/5  Bilateral wrist extension strength 5 /5  Bilateral finger extension 5/5  Bilateral hip extension 5/5  Bilateral hip flexion 5 /5  Bilateral foot extension 5 /5  Bilateral foot flexion 5/5    No tongue fasciculations noted    Patient denies change in voice.    Mild dysarthria noted          Vitals:   Temp:  [96.9 °F (36.1 °C)-98.1 °F (36.7 °C)] 96.9 °F (36.1 °C)  Heart Rate:  [58-92] 76  Resp:  [14-18] 17  BP: (121-157)/(65-91) 138/83    Current Medications    Current Facility-Administered Medications:     aspirin chewable tablet 81 mg, 81 mg, Oral, Daily, Precious Guillaume APRN, 81 mg at 01/02/24 0800    polyethylene glycol (MIRALAX) packet 17 g, 17 g, Oral, Daily PRN **AND** bisacodyl (DULCOLAX) EC tablet 5 mg, 5 mg, Oral, Daily PRN **AND** bisacodyl (DULCOLAX) suppository 10 mg, 10 mg, Rectal, Daily PRN, Precious Guillaume APRN    fluticasone (FLONASE) 50 MCG/ACT nasal spray 2 spray, 2 spray, Nasal, Daily PRN, Precious Guillaume APRN    heparin (porcine) 5000 UNIT/ML injection 5,000 Units, 5,000 Units, Subcutaneous, Q8H, Precious Guillaume, DUTCH, 5,000 Units at 01/02/24 0677     "nitroglycerin (NITROSTAT) SL tablet 0.4 mg, 0.4 mg, Sublingual, Q5 Min PRN, Precious Guillaume APRN    pantoprazole (PROTONIX) EC tablet 40 mg, 40 mg, Oral, Q AM, Precious Guillaume APRN, 40 mg at 01/02/24 0628    sodium chloride 0.9 % flush 10 mL, 10 mL, Intravenous, Q12H, Precious Guillaume APRN, 10 mL at 01/02/24 0800    sodium chloride 0.9 % flush 10 mL, 10 mL, Intravenous, PRN, Precious Guillaume, DUTCH    sodium chloride 0.9 % infusion 40 mL, 40 mL, Intravenous, PRN, Precious Guillaume APRN    Laboratory Results:   Lab Results   Component Value Date    GLUCOSE 93 01/02/2024    CALCIUM 8.8 01/02/2024     01/02/2024    K 3.9 01/02/2024    CO2 26.0 01/02/2024     01/02/2024    BUN 12 01/02/2024    CREATININE 0.92 01/02/2024    EGFRIFNONA 106 08/07/2018    BCR 13.0 01/02/2024    ANIONGAP 9.0 01/02/2024     Lab Results   Component Value Date    WBC 5.30 01/02/2024    HGB 13.8 01/02/2024    HCT 40.2 01/02/2024    MCV 88.0 01/02/2024     01/02/2024     Lab Results   Component Value Date    CHOL 197 08/04/2018     Lab Results   Component Value Date    HDL 67 (H) 08/04/2018     Lab Results   Component Value Date     (H) 08/04/2018     Lab Results   Component Value Date    TRIG 86 08/04/2018     No results found for: \"HGBA1C\"  Lab Results   Component Value Date    INR 0.90 01/01/2024    PROTIME 12.3 01/01/2024     No results found for: \"FOLATE\"  No results found for: \"[id number]\"    MRI Brain Without Contrast    Result Date: 1/1/2024  MRI BRAIN WO CONTRAST Date of Exam: 1/1/2024 2:39 PM EST Indication: Neuro deficit, acute, stroke suspected stroke symptoms. Dysarthria  Comparison: CT angiography of the head and neck 1/1/2024 Technique:  Routine multiplanar/multisequence sequence images of the brain were obtained without contrast administration. Findings: No diffusion restriction to indicate acute ischemia. No mass effect or midline shift. No findings of intraparenchymal hemorrhage. No abnormal " "extra-axial fluid collection. Posterior fossa without acute abnormality. Partially empty sella. No suprasellar mass. Ventricles and cortical sulci are normally configured. The major T2 weighted intracranial vascular flow voids are patent, intracranial vasculature better assessed on the separately performed CT angiography. No cerebellar pontine angle mass. Normal appearance of the cerebellum. The mastoid air cells are well-aerated. No sinus fluid level. Globes intact. No retro-orbital abnormality. Normal calvarial marrow signal. Visualized portions of the upper cervical spine demonstrate multilevel disc desiccation with posterior disc osteophyte complexes contributing to upper cervical canal stenosis at C3-4 and C5-6 partially imaged.     Impression: Impression: 1. No acute intracranial abnormality, specifically negative for acute ischemia. 2. Chronic findings above. Electronically Signed: Sánchez Beckwith MD  1/1/2024 3:11 PM EST  Workstation ID: [id number]        Assessment / Plan   Brief Patient Summary:  Jackelyn Diaz is a 63 y.o. 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 pylori who presented to [facility] ED with complaint of slurred speech lasting approximately 2 weeks.  Patient describes her tongue feeling \"big\". Patient originally seen by our stroke team with a negative workup.    MRI with acute intercranial abnormalities.    CTAs of head and neck with no intracranial LVO, stenosis or aneurysm    Plan:   Mild macroglossia   Patient symptoms are unlikely to be neurologic in nature.  No evidence of neurologic disease.  Patient seen by ST please see note  Spoke with patient on steroid therapy.  She expressed concern with P.O. steroids, as previously she was told they caused her pancreatitis.  Will hold off.  Patient to follow-up with PCP.  General " neurology has no further recommendations please feel free chest pain questions.  Patient is okay to discharge from neurologic standpoint.      I have discussed the above with the patient, bedside RN, patient's , Dr. Downing  Time spent with patient: 60 minutes in face-to-face evaluation and management of the patient.      Marizol Boston, APRN

## 2024-01-02 NOTE — THERAPY EVALUATION
Acute Care - Speech Language Pathology Initial Evaluation  [facility]  Cognitive-Communication Evaluation   Clinical Swallow Evaluation      Patient Name: Jackelyn Diaz  : 1960  MRN: [id number]  Today's Date: 2024               Admit Date: 2024     Visit Dx:    ICD-10-CM ICD-9-CM   1. Slurred speech  R47.81 784.59   2. Oropharyngeal dysphagia  R13.12 787.22   3. Hyperlipidemia, unspecified hyperlipidemia type  E78.5 272.4     Patient Active Problem List   Diagnosis    Acute pancreatitis without infection or necrosis    Leukocytosis    Breast cancer    Precordial pain    History of DVT (deep vein thrombosis)    Family history of colon cancer    History of hysterectomy    Difficulty with speech    History of Helicobacter pylori infection    History of breast cancer    Anemia, chronic disease    Asthma    History of DVT (deep vein thrombosis)    History of hysterectomy    History of pancreatitis     Past Medical History:   Diagnosis Date    A-fib     Anemia     Asthma     Breast cancer 2012    LEFT    Gestational diabetes     Hx of radiation therapy 2012    LEFT BREAST CANCER    Hyperlipidemia     Kidney stone     Osteoarthritis     PONV (postoperative nausea and vomiting)     Varicella      Past Surgical History:   Procedure Laterality Date    BREAST EXCISIONAL BIOPSY Right 2011    BREAST LUMPECTOMY Left 2012    HYSTEROSCOPY      TOTAL ABDOMINAL HYSTERECTOMY WITH SALPINGO OOPHORECTOMY      enlarged uterus due to tamoxifen reaction    WISDOM TOOTH EXTRACTION         SLP Recommendation and Plan  SLP Diagnosis: mild-moderate, dysarthria (24 1020)           Swallow Criteria for Skilled Therapeutic Interventions Met: no problems identified which require skilled intervention (24 1020)  SLC Criteria for Skilled Therapy Interventions Met: yes (24 1020)  Anticipated Discharge Disposition (SLP): home with OP services (24 1020)     Therapy Frequency (Swallow): evaluation only (24  1020)  Therapy Frequency (SLP SLC): 3 days per week (01/02/24 1020)  Predicted Duration Therapy Intervention (Days): until discharge (01/02/24 1020)  Oral Care Recommendations: Oral Care BID/PRN, Toothbrush (01/02/24 1020)                          Plan of Care Reviewed With: patient, family (01/02/24 1049)      SLP EVALUATION (last 72 hours)       SLP SLC Evaluation       Row Name 01/02/24 1020                   Communication Assessment/Intervention    Document Type evaluation  -MP        Subjective Information no complaints  -MP        Patient Observations alert;cooperative  -MP        Patient/Family/Caregiver Comments/Observations Family present  -MP        Patient Effort good  -MP           General Information    Patient Profile Reviewed yes  -MP        Pertinent History Of Current Problem Adm 1/1 w/ slurred speech for 2 wks. MRI w/ no acute. Passed dysphagia screen (though not stroke screen) but still have swallow consult. Hx asthma, anemia, pancreatitis, breast ca, food impaction s/p EGD in Oct '23- said now has wheat/gluten intolerance.  -MP        Precautions/Limitations, Vision WFL with corrective lenses  -MP        Precautions/Limitations, Hearing WFL  -MP        Prior Level of Function-Communication WFL  -MP        Plans/Goals Discussed with patient;family;agreed upon  -MP        Barriers to Rehab none identified  -MP        Patient's Goals for Discharge patient did not state  -MP        Family Goals for Discharge family did not state  -MP           Pain    Additional Documentation Pain Scale: FACES Pre/Post-Treatment (Group)  -MP           Pain Scale: FACES Pre/Post-Treatment    Pain: FACES Scale, Pretreatment 0-->no hurt  -MP        Posttreatment Pain Rating 0-->no hurt  -MP           Comprehension Assessment/Intervention    Comprehension Assessment/Intervention Auditory Comprehension  -MP           Auditory Comprehension Assessment/Intervention    Auditory Comprehension (Communication) WFL  -MP         "   Expression Assessment/Intervention    Expression Assessment/Intervention verbal expression  -MP           Verbal Expression Assessment/Intervention    Verbal Expression WFL  -MP           Oral Musculature and Cranial Nerve Assessment    Oral Motor General Assessment oral labial or buccal impairment  -MP        Oral Labial or Buccal Impairment, Detail, Cranial Nerve VII (Facial): right labial droop;other (see comments)  pt says is baseline, has \"crooked smile\"  -MP        Lingual Impairment, Detail. Cranial Nerves IX, XII (Glossopharyngeal and Hypoglossal) other (see comments)  pt reported has bitten R cheek and side of tongue but isn't numb  -MP           Motor Speech Assessment/Intervention    Motor Speech Function mild impairment;moderate impairment  -MP        Characteristics Consistent with Dysarthria decreased articulation  -MP           Cognitive Assessment Intervention- SLP    Cognitive Function (Cognition) WFL  -MP           SLP Evaluation Clinical Impressions    SLP Diagnosis mild-moderate;dysarthria  -MP        Rehab Potential/Prognosis good  -MP        SLC Criteria for Skilled Therapy Interventions Met yes  -MP        Functional Impact difficulty in expressing complex messages;functional impact in social situations;restrictions in personal and social life  -MP           Recommendations    Therapy Frequency (SLP SLC) 3 days per week  -MP        Predicted Duration Therapy Intervention (Days) until discharge  -MP        Anticipated Discharge Disposition (SLP) home with OP services  -MP                  User Key  (r) = Recorded By, (t) = Taken By, (c) = Cosigned By      Initials Name Effective Dates    Berlin Sifuentes MS CCC-SLP 12/28/21 -                        EDUCATION  The patient has been educated in the following areas:     Cognitive Impairment Communication Impairment.           SLP GOALS       Row Name 01/02/24 1020             Patient will demonstrate functional speech skills for return " to discharge environment    [place] Independently  -MP      Time frame by discharge  -MP         Articulation Goal 1 (SLP)    Improve Articulation Goal 1 (SLP) by over-articulating in connected speech;80%;with minimal cues (75-90%)  -MP      Time Frame (Articulation Goal 1, SLP) short term goal (STG)  -MP                User Key  (r) = Recorded By, (t) = Taken By, (c) = Cosigned By      Initials Name Provider Type    Berlin Sifuentes MS CCC-SLP Speech and Language Pathologist                            Time Calculation:      Time Calculation- SLP       Row Name 24 1049             Time Calculation- SLP    SLP Start Time 1020  -MP      SLP Received On 24  -MP         Untimed Charges    73549-FG Eval Speech and Production w/ Language Minutes 24  -MP      14115-TV Eval Oral Pharyng Swallow Minutes 24  -MP         Total Minutes    Untimed Charges Total Minutes 48  -MP       Total Minutes 48  -MP                User Key  (r) = Recorded By, (t) = Taken By, (c) = Cosigned By      Initials Name Provider Type    Berlin Sifuentes MS CCC-SLP Speech and Language Pathologist                    Therapy Charges for Today       Code Description Service Date Service Provider Modifiers Qty    93859094347 HC ST EVAL ORAL PHARYNG SWALLOW 2 2024 Berlin Garcia MS CCC-SLP GN 1    60853737922 HC ST EVAL SPEECH AND PROD W LANG  2 2024 Berlin Garcia MS CCC-SLP GN 1                       MS CADEN Landry  2024   and Acute Care - Speech Language Pathology   Swallow Initial Evaluation  [place]     Patient Name: Jackelyn Diaz  : 1960  MRN: [id number]  Today's Date: 2024               Admit Date: 2024    Visit Dx:     ICD-10-CM ICD-9-CM   1. Slurred speech  R47.81 784.59   2. Oropharyngeal dysphagia  R13.12 787.22   3. Hyperlipidemia, unspecified hyperlipidemia type  E78.5 272.4     Patient Active Problem List   Diagnosis    Acute pancreatitis  without infection or necrosis    Leukocytosis    Breast cancer    Precordial pain    History of DVT (deep vein thrombosis)    Family history of colon cancer    History of hysterectomy    Difficulty with speech    History of Helicobacter pylori infection    History of breast cancer    Anemia, chronic disease    Asthma    History of DVT (deep vein thrombosis)    History of hysterectomy    History of pancreatitis     Past Medical History:   Diagnosis Date    A-fib     Anemia     Asthma     Breast cancer 2012    LEFT    Gestational diabetes     Hx of radiation therapy 2012    LEFT BREAST CANCER    Hyperlipidemia     Kidney stone     Osteoarthritis     PONV (postoperative nausea and vomiting)     Varicella      Past Surgical History:   Procedure Laterality Date    BREAST EXCISIONAL BIOPSY Right 2011    BREAST LUMPECTOMY Left 2012    HYSTEROSCOPY      TOTAL ABDOMINAL HYSTERECTOMY WITH SALPINGO OOPHORECTOMY      enlarged uterus due to tamoxifen reaction    WISDOM TOOTH EXTRACTION         SLP Recommendation and Plan  SLP Swallowing Diagnosis: swallow WFL/no suspected pharyngeal impairment (01/02/24 1020)  SLP Diet Recommendation: regular textures, thin liquids (01/02/24 1020)  Recommended Precautions and Strategies: general aspiration precautions (01/02/24 1020)  SLP Rec. for Method of Medication Administration: as tolerated (01/02/24 1020)     Monitor for Signs of Aspiration: notify SLP if any concerns (01/02/24 1020)     Swallow Criteria for Skilled Therapeutic Interventions Met: no problems identified which require skilled intervention (01/02/24 1020)  Anticipated Discharge Disposition (SLP): home with OP services (01/02/24 1020)     Therapy Frequency (Swallow): evaluation only (01/02/24 1020)  Predicted Duration Therapy Intervention (Days): until discharge (01/02/24 1020)  Oral Care Recommendations: Oral Care BID/PRN, Toothbrush (01/02/24 1020)                                      Oral Care Recommendations: Oral Care  BID/PRN, Toothbrush (01/02/24 1020)    Plan of Care Reviewed With: patient, family      SWALLOW EVALUATION (last 72 hours)       SLP Adult Swallow Evaluation       Row Name 01/02/24 1020                   General Information    Current Method of Nutrition thin liquids;regular textures  -MP        Prior Level of Function-Communication WFL  -MP        Prior Level of Function-Swallowing no diet consistency restrictions  -MP        Patient's Goals for Discharge patient did not state  -MP        Family Goals for Discharge family did not state  -MP           Oral Motor Structure and Function    Dentition Assessment natural, present and adequate  -MP        Secretion Management WNL/WFL  -MP        Mucosal Quality moist, healthy  -MP           General Eating/Swallowing Observations    Respiratory Support Currently in Use room air  -MP        Eating/Swallowing Skills self-fed  -MP        Positioning During Eating upright in bed  -MP        Utensils Used spoon;cup;straw  -MP        Consistencies Trialed thin liquids;pureed  -MP           Clinical Swallow Eval    Oral Prep Phase WFL  -MP        Pharyngeal Phase no overt signs/symptoms of pharyngeal impairment  -MP           SLP Evaluation Clinical Impression    SLP Swallowing Diagnosis swallow WFL/no suspected pharyngeal impairment  -MP        Swallow Criteria for Skilled Therapeutic Interventions Met no problems identified which require skilled intervention  -MP           Recommendations    Therapy Frequency (Swallow) evaluation only  -MP        SLP Diet Recommendation regular textures;thin liquids  -MP        Recommended Precautions and Strategies general aspiration precautions  -MP        Oral Care Recommendations Oral Care BID/PRN;Toothbrush  -MP        SLP Rec. for Method of Medication Administration as tolerated  -MP        Monitor for Signs of Aspiration notify SLP if any concerns  -MP                  User Key  (r) = Recorded By, (t) = Taken By, (c) = Cosigned By       Initials Name Effective Dates    Berlin Sifuentes MS CCC-SLP 12/28/21 -                     EDUCATION  The patient has been educated in the following areas:   Dysphagia (Swallowing Impairment).        SLP GOALS       Row Name 01/02/24 1020             Patient will demonstrate functional speech skills for return to discharge environment    [place] Independently  -MP      Time frame by discharge  -MP         Articulation Goal 1 (SLP)    Improve Articulation Goal 1 (SLP) by over-articulating in connected speech;80%;with minimal cues (75-90%)  -MP      Time Frame (Articulation Goal 1, SLP) short term goal (STG)  -MP                User Key  (r) = Recorded By, (t) = Taken By, (c) = Cosigned By      Initials Name Provider Type    Berlin Sifuentes MS CCC-SLP Speech and Language Pathologist                       Time Calculation:    Time Calculation- SLP       Row Name 01/02/24 1049             Time Calculation- SLP    SLP Start Time 1020  -MP      SLP Received On 01/02/24  -MP         Untimed Charges    63753-ZO Eval Speech and Production w/ Language Minutes 24  -MP      25650-CL Eval Oral Pharyng Swallow Minutes 24  -MP         Total Minutes    Untimed Charges Total Minutes 48  -MP       Total Minutes 48  -MP                User Key  (r) = Recorded By, (t) = Taken By, (c) = Cosigned By      Initials Name Provider Type    Berlin Sifuentes MS CCC-SLP Speech and Language Pathologist                    Therapy Charges for Today       Code Description Service Date Service Provider Modifiers Qty    77634373324 HC ST EVAL ORAL PHARYNG SWALLOW 2 1/2/2024 Berlin Garcia MS CCC-SLP GN 1    [id number] HC ST EVAL SPEECH AND PROD W LANG  2 1/2/2024 Berlin Garcia MS CCC-SLP GN 1                 MS CADEN Landry  1/2/2024

## 2024-01-02 NOTE — PLAN OF CARE
Goal Outcome Evaluation:  Plan of Care Reviewed With: patient, family            SLP evaluation completed. Will address dysarthria. Please see note for further details and recommendations.       Anticipated Discharge Disposition (SLP): home with OP services

## 2024-01-02 NOTE — DISCHARGE SUMMARY
[facility] Medicine Services  DISCHARGE SUMMARY    Patient Name: Jackelyn Diaz  : 1960  MRN: [id number]    Date of Admission: 2024 12:53 PM  Date of Discharge: 2024  Primary Care Physician: Vikas Jimenes PA    Consults       Date and Time Order Name Status Description    2024  8:15 PM Inpatient Neurology Consult General Completed             Hospital Course       Active Hospital Problems    Diagnosis  POA    **Difficulty with speech [R47.9]  Yes    History of breast cancer [Z85.3]  Not Applicable    Anemia, chronic disease [D63.8]  Unknown    Asthma [J45.909]  Unknown    History of DVT (deep vein thrombosis) [Z86.718]  Not Applicable    History of hysterectomy [Z90.710]  Unknown    History of pancreatitis [Z87.19]  Not Applicable    History of Helicobacter pylori infection [Z86.19]  Unknown      Resolved Hospital Problems   No resolved problems to display.          Hospital Course:  Jackelyn Diaz is a 63 y.o. female with asthma, anemia, prior pancreatitis x 6 (reported drug-induced), prior breast cancer s/p lumpectomy, hysterectomy, prior mononucleosis infection , recently seen at outside facility with food impaction s/p EGD with results positive for H. pylori, she was prescribed Talicia for H. pylori treatment but has not yet started it.  Approximately 2 weeks ago she developed a sensation of her tongue feeling large, resulting in her biting her tongue multiple times, she had associated dysarthric speech and friend/family were worried she was having a stroke and sent her to the hospital for further evaluation.  She was admitted for stroke evaluation which was unrevealing, MRI negative for acute process, clinical syndrome not consistent with the same.  Unlikely anaphylactic reaction as symptoms have been going on for 2 weeks or longer, normal eosinophils, patent airway, we did discuss a trial of steroid therapy which she was hesitant to try due to prior  pancreatitis related to steroid Dosepak.  She does report quite profound GERD/reflux symptoms, stating sometimes she will have severe burning and reflux coming out of her nose.  Her symptoms may be related to ongoing inflammation/irritation of her posterior oropharynx from severe reflux, I have encouraged her to pursue treatment for her H. pylori.  We discussed symptoms of anaphylaxis/angioedema, including worsening tongue swelling, compromise of the airway, etc.  She voiced back that she would return to the ED if any of those symptoms develop.  I have recommended she follow-up with PCP in 1 week.      Discharge Follow Up Recommendations for outpatient labs/diagnostics:  Follow-up with PCP in 1 week    Day of Discharge     HPI:   Tongue still feels slightly enlarged but no other acute process.    Vital Signs:   Temp:  [96.9 °F (36.1 °C)-98.1 °F (36.7 °C)] 97.9 °F (36.6 °C)  Heart Rate:  [62-92] 82  Resp:  [16-18] 17  BP: (121-138)/(65-84) 128/73      Physical Exam:  Constitutional: Awake, alert, sitting up in bed no acute distress  HENT: NCAT, mucous membranes moist, no evidence for trauma to the tongue or indentations on the periphery of the tongue from her teeth  Respiratory: Clear to auscultation bilaterally, respiratory effort normal   Cardiovascular: RRR, palpable radial pulse  Gastrointestinal: Positive bowel sounds, soft, nontender, nondistended  Psychiatric: Appropriate affect, cooperative  Neurologic: Mildly dysarthric speech    Pertinent  and/or Most Recent Results     LAB RESULTS:      Lab 01/02/24  0621 01/01/24  1312   WBC 5.30 5.03   HEMOGLOBIN 13.8 15.8   HEMATOCRIT 40.2 47.1*   PLATELETS 293 331   NEUTROS ABS 2.64 2.65   IMMATURE GRANS (ABS) 0.01 0.01   LYMPHS ABS 2.02 1.86   MONOS ABS 0.38 0.33   EOS ABS 0.21 0.14   MCV 88.0 90.9   LACTATE  --  1.3   PROTIME  --  12.3   APTT  --  27.7*         Lab 01/02/24  0621 01/01/24  1312   SODIUM 141 143   POTASSIUM 3.9 3.9   CHLORIDE 106 105   CO2 26.0 28.0    ANION GAP 9.0 10.0   BUN 12 10   CREATININE 0.92 0.98   EGFR 71.0 65.8   GLUCOSE 93 86   CALCIUM 8.8 9.4   MAGNESIUM  --  2.2   PHOSPHORUS  --  3.3   TSH  --  1.990         Lab 01/01/24  1312   TOTAL PROTEIN 7.5   ALBUMIN 4.4   GLOBULIN 3.1   ALT (SGPT) 19   AST (SGOT) 24   BILIRUBIN 0.4   ALK PHOS 83         Lab 01/01/24  1312   HSTROP T 8   PROTIME 12.3   INR 0.90             Lab 01/02/24  0621   FERRITIN 92.43         Brief Urine Lab Results  (Last result in the past 365 days)        Color   Clarity   Blood   Leuk Est   Nitrite   Protein   CREAT   Urine HCG        01/01/24 1313 Yellow   Clear   Negative   Trace   Negative   Negative                 Microbiology Results (last 10 days)       ** No results found for the last 240 hours. **            MRI Brain Without Contrast    Result Date: 1/1/2024  MRI BRAIN WO CONTRAST Date of Exam: 1/1/2024 2:39 PM EST Indication: Neuro deficit, acute, stroke suspected stroke symptoms. Dysarthria  Comparison: CT angiography of the head and neck 1/1/2024 Technique:  Routine multiplanar/multisequence sequence images of the brain were obtained without contrast administration. Findings: No diffusion restriction to indicate acute ischemia. No mass effect or midline shift. No findings of intraparenchymal hemorrhage. No abnormal extra-axial fluid collection. Posterior fossa without acute abnormality. Partially empty sella. No suprasellar mass. Ventricles and cortical sulci are normally configured. The major T2 weighted intracranial vascular flow voids are patent, intracranial vasculature better assessed on the separately performed CT angiography. No cerebellar pontine angle mass. Normal appearance of the cerebellum. The mastoid air cells are well-aerated. No sinus fluid level. Globes intact. No retro-orbital abnormality. Normal calvarial marrow signal. Visualized portions of the upper cervical spine demonstrate multilevel disc desiccation with posterior disc osteophyte complexes  contributing to upper cervical canal stenosis at C3-4 and C5-6 partially imaged.     Impression: 1. No acute intracranial abnormality, specifically negative for acute ischemia. 2. Chronic findings above. Electronically Signed: Sánchez Beckwith MD  1/1/2024 3:11 PM EST  Workstation ID: [id number]    CT Angiogram Head w AI Analysis of LVO    Result Date: 1/1/2024  CT ANGIOGRAM HEAD W AI ANALYSIS OF LVO, CT ANGIOGRAM NECK Date of Exam: 1/1/2024 1:53 PM EST Indication: progressively worsening slurred speech 2 weeks,. Comparison: None available. Technique: CTA of the head and neck was performed after the uneventful intravenous administration of 75 mL Isovue-370. Reconstructed coronal and sagittal images were also obtained. In addition, a 3-D volume rendered image was created for interpretation. Automated exposure control and iterative reconstruction methods were used. Findings: Neck: Lung apices are clear. Visualized pulmonary arteries are normally opacified with contrast. The thoracic aortic arch branch vessels are patent. The left common, internal, and external carotid arteries are patent. Negative for left carotid stenosis. The distal left ICA is patent to the [place]. The right common carotid artery is patent with angulated, tortuous course proximally, similar to prior chest CT. There is streak artifact from contrast bolus timing in the right subclavian vein which partially limits assessment of the proximal right common carotid artery. The right internal and external carotid arteries are patent. Negative for right-sided carotid stenosis. The distal right ICA is patent to the [place]. The left vertebral artery is dominant and patent throughout its course. The right vertebral artery is patent. No acute soft tissue abnormality in the neck. Internal jugular veins are patent within limits of contrast timing. Streak artifact from dental amalgam limits oral pharyngeal assessment. No acute abnormality of the  aerodigestive soft tissues. Scattered cervical chain lymph nodes are below size criteria. Multilevel disc narrowing in the cervical spine. No aggressive osseous lesion or acute fracture. Head: Distal internal carotid arteries are patent. Both anterior cerebral arteries are patent. Anterior communicating artery patent. The left middle cerebral artery is patent at the M1 and visualized proximal M2 segments. The right middle cerebral artery is patent at the M1 and visualized proximal M2 segments. The basilar artery is patent. Bilateral superior cerebellar arteries are patent. Fetal origin of the right posterior cerebral artery. Predominant fetal origin of the left PCA. Both posterior cerebral arteries are patent. Basilar artery is patent. The bilateral superior cerebellar arteries are patent. No significant intracranial aneurysm. Within limits of contrast timing the major intracranial venous sinuses are patent. Negative for midline shift or mass effect. No findings of intracranial hemorrhage. Posterior fossa without acute abnormality. The globes are symmetric. The mastoid air cells are well-aerated. Negative for sinus fluid level.     Impression: 1. No central intracranial large vessel occlusion. 2. Patent bilateral carotid arteries. 3. Patent bilateral vertebral arteries. Electronically Signed: Sánchez Beckwith MD  1/1/2024 2:42 PM EST  Workstation ID: [id number]    CT Angiogram Neck    Result Date: 1/1/2024  CT ANGIOGRAM HEAD W AI ANALYSIS OF LVO, CT ANGIOGRAM NECK Date of Exam: 1/1/2024 1:53 PM EST Indication: progressively worsening slurred speech 2 weeks,. Comparison: None available. Technique: CTA of the head and neck was performed after the uneventful intravenous administration of 75 mL Isovue-370. Reconstructed coronal and sagittal images were also obtained. In addition, a 3-D volume rendered image was created for interpretation. Automated exposure control and iterative reconstruction methods were used. Findings:  Neck: Lung apices are clear. Visualized pulmonary arteries are normally opacified with contrast. The thoracic aortic arch branch vessels are patent. The left common, internal, and external carotid arteries are patent. Negative for left carotid stenosis. The distal left ICA is patent to the [place]. The right common carotid artery is patent with angulated, tortuous course proximally, similar to prior chest CT. There is streak artifact from contrast bolus timing in the right subclavian vein which partially limits assessment of the proximal right common carotid artery. The right internal and external carotid arteries are patent. Negative for right-sided carotid stenosis. The distal right ICA is patent to the [place]. The left vertebral artery is dominant and patent throughout its course. The right vertebral artery is patent. No acute soft tissue abnormality in the neck. Internal jugular veins are patent within limits of contrast timing. Streak artifact from dental amalgam limits oral pharyngeal assessment. No acute abnormality of the aerodigestive soft tissues. Scattered cervical chain lymph nodes are below size criteria. Multilevel disc narrowing in the cervical spine. No aggressive osseous lesion or acute fracture. Head: Distal internal carotid arteries are patent. Both anterior cerebral arteries are patent. Anterior communicating artery patent. The left middle cerebral artery is patent at the M1 and visualized proximal M2 segments. The right middle cerebral artery is patent at the M1 and visualized proximal M2 segments. The basilar artery is patent. Bilateral superior cerebellar arteries are patent. Fetal origin of the right posterior cerebral artery. Predominant fetal origin of the left PCA. Both posterior cerebral arteries are patent. Basilar artery is patent. The bilateral superior cerebellar arteries are patent. No significant intracranial aneurysm. Within limits of contrast timing the major  intracranial venous sinuses are patent. Negative for midline shift or mass effect. No findings of intracranial hemorrhage. Posterior fossa without acute abnormality. The globes are symmetric. The mastoid air cells are well-aerated. Negative for sinus fluid level.     Impression: 1. No central intracranial large vessel occlusion. 2. Patent bilateral carotid arteries. 3. Patent bilateral vertebral arteries. Electronically Signed: Sánchez Beckwith MD  1/1/2024 2:42 PM EST  Workstation ID: [id number]         Pending Labs       Order Current Status    IgE In process    Vitamin B12 In process          Discharge Details        Discharge Medications        Continue These Medications        Instructions Start Date   aspirin 81 MG chewable tablet   81 mg, Oral, Daily      Clobetasol Propionate 0.05 % external spray   No dose, route, or frequency recorded.      Cloderm 0.1 % cream  Generic drug: Clocortolone Pivalate   No dose, route, or frequency recorded.      fluticasone 50 MCG/ACT nasal spray  Commonly known as: FLONASE   2 sprays, Nasal, Daily      LORATADINE ALLERGY RELIEF PO   Oral      pantoprazole 40 MG EC tablet  Commonly known as: PROTONIX   40 mg, Oral, Daily               Allergies   Allergen Reactions    Red Dye Other (See Comments)     pancreatitis    Tamoxifen Itching    Wheat GI Intolerance    Povidone Iodine Rash         Discharge Disposition:  Home or Self Care    Diet:  Hospital:  Diet Order   Procedures    Diet: Regular/House Diet; Texture: Regular Texture (IDDSI 7); Fluid Consistency: Thin (IDDSI 0)            CODE STATUS:    Code Status and Medical Interventions:   Ordered at: 01/01/24 7998     Level Of Support Discussed With:    Patient     Code Status (Patient has no pulse and is not breathing):    CPR (Attempt to Resuscitate)     Medical Interventions (Patient has pulse or is breathing):    Full Support       No future appointments.    Additional Instructions for the Follow-ups that You Need to Schedule        Discharge Follow-up with PCP   As directed       Currently Documented PCP:    Vikas Jimenes PA    PCP Phone Number:    [phone]     Follow Up Details: 1 week                      Yunior Downing DO  01/02/24      Time Spent on Discharge:  I spent  33  minutes on this discharge activity which included: face-to-face encounter with the patient, reviewing the data in the system, coordination of the care with the nursing staff as well as consultants, documentation, and entering orders.

## 2024-01-02 NOTE — PLAN OF CARE
Goal Outcome Evaluation:         Pt A&Ox4, RA, tele d/c'd. Spouse at bedside. Discharge teaching complete, both parties in agreement with plan. IV removed. No telebox to return, room is hardwired. Spouse to transport home. Hospital transport requested.

## 2024-01-05 LAB — IGE SERPL-ACNC: 16 IU/ML (ref 6–495)

## 2024-01-19 ENCOUNTER — TELEPHONE (OUTPATIENT)
Dept: NEUROLOGY | Facility: CLINIC | Age: 64
End: 2024-01-19

## 2024-01-19 ENCOUNTER — OFFICE VISIT (OUTPATIENT)
Dept: NEUROLOGY | Facility: CLINIC | Age: 64
End: 2024-01-19
Payer: COMMERCIAL

## 2024-01-19 ENCOUNTER — HOSPITAL ENCOUNTER (OUTPATIENT)
Dept: MAMMOGRAPHY | Facility: HOSPITAL | Age: 64
Discharge: HOME | End: 2024-01-19
Payer: COMMERCIAL

## 2024-01-19 ENCOUNTER — LAB (OUTPATIENT)
Dept: LAB | Facility: HOSPITAL | Age: 64
End: 2024-01-19
Payer: COMMERCIAL

## 2024-01-19 ENCOUNTER — TRANSCRIBE ORDERS (OUTPATIENT)
Dept: ADMINISTRATIVE | Facility: HOSPITAL | Age: 64
End: 2024-01-19
Payer: COMMERCIAL

## 2024-01-19 VITALS
HEART RATE: 116 BPM | BODY MASS INDEX: 27.64 KG/M2 | HEIGHT: 63 IN | WEIGHT: 156 LBS | SYSTOLIC BLOOD PRESSURE: 120 MMHG | DIASTOLIC BLOOD PRESSURE: 80 MMHG | OXYGEN SATURATION: 98 %

## 2024-01-19 DIAGNOSIS — R47.1 DYSARTHRIA: Primary | ICD-10-CM

## 2024-01-19 DIAGNOSIS — R29.810 FACIAL DROOP: ICD-10-CM

## 2024-01-19 DIAGNOSIS — Z12.31 VISIT FOR SCREENING MAMMOGRAM: Primary | ICD-10-CM

## 2024-01-19 DIAGNOSIS — R13.10 DYSPHAGIA, UNSPECIFIED TYPE: ICD-10-CM

## 2024-01-19 DIAGNOSIS — R47.1 DYSARTHRIA: ICD-10-CM

## 2024-01-19 DIAGNOSIS — Z82.0 FAMILY HX OF ALS (AMYOTROPHIC LATERAL SCLEROSIS): ICD-10-CM

## 2024-01-19 DIAGNOSIS — Z12.31 VISIT FOR SCREENING MAMMOGRAM: ICD-10-CM

## 2024-01-19 DIAGNOSIS — R49.0 HYPOPHONIA WITH HOARSENESS: ICD-10-CM

## 2024-01-19 PROBLEM — E78.2 MIXED HYPERLIPIDEMIA: Status: ACTIVE | Noted: 2021-03-26

## 2024-01-19 PROBLEM — E55.9 VITAMIN D DEFICIENCY: Status: ACTIVE | Noted: 2021-03-26

## 2024-01-19 PROBLEM — K21.9 GASTROESOPHAGEAL REFLUX DISEASE WITHOUT ESOPHAGITIS: Status: ACTIVE | Noted: 2021-03-26

## 2024-01-19 PROBLEM — Z85.3 PERSONAL HISTORY OF MALIGNANT NEOPLASM OF BREAST: Status: ACTIVE | Noted: 2021-03-26

## 2024-01-19 PROBLEM — J30.1 ALLERGIC RHINITIS DUE TO POLLEN: Status: ACTIVE | Noted: 2022-04-22

## 2024-01-19 LAB
CA-I BLD-MCNC: 5 MG/DL (ref 4.6–5.4)
CA-I SERPL ISE-MCNC: 1.25 MMOL/L (ref 1.15–1.35)
CALCIUM SPEC-SCNC: 8.8 MG/DL (ref 8.6–10.5)
CK SERPL-CCNC: 43 U/L (ref 20–180)
PTH-INTACT SERPL-MCNC: 28 PG/ML (ref 15–65)

## 2024-01-19 PROCEDURE — 82550 ASSAY OF CK (CPK): CPT

## 2024-01-19 PROCEDURE — 77067 SCR MAMMO BI INCL CAD: CPT

## 2024-01-19 PROCEDURE — 83970 ASSAY OF PARATHORMONE: CPT

## 2024-01-19 PROCEDURE — 77063 BREAST TOMOSYNTHESIS BI: CPT

## 2024-01-19 PROCEDURE — 82330 ASSAY OF CALCIUM: CPT

## 2024-01-19 PROCEDURE — 83921 ORGANIC ACID SINGLE QUANT: CPT

## 2024-01-19 PROCEDURE — 36415 COLL VENOUS BLD VENIPUNCTURE: CPT

## 2024-01-19 PROCEDURE — 82310 ASSAY OF CALCIUM: CPT

## 2024-01-19 NOTE — LETTER
January 19, 2024     GISELA Lopez  [address] [facility] [address]    Patient: Jackelyn Diaz   YOB: 1960   Date of Visit: 1/19/2024       Dear GISELA Lopez,    Thank you for referring Jackelyn Diaz to me for evaluation. Below is a copy of my consult note.    If you have questions, please do not hesitate to call me. I look forward to following Jackelyn along with you.         Sincerely,        DUTCH Shanks        CC: Virgil Villanueva MD   SPEECH LANGUAGE PATHOLOGY  GISELA Jones    Subjective:     Patient ID: Jackelyn Diaz is a 63 y.o. female.    CC:   Chief Complaint   Patient presents with   • Speech Problem     Started before mitra       HPI:   History of Present Illness  Today 1/19/2024-  This is a very pleasant 63-year-old female who was recently hospitalized from 01/01/2024 to 01/02/2024 at [facility] for a sensation of her tongue feeling large resulting in biting her tongue multiple times, dysarthria, and family was worried about her having a stroke. She was admitted for stroke evaluation, which was unrevealing. MRI of the brain was negative for acute process. They did not feel this was an anaphylactic reaction. They did discuss possibly using steroids, but she was hesitant with her prior pancreatitis. She did have profound GERD reflux symptoms. They recommended she obtain work-up for treatment of those symptoms. She was evaluated inpatient by neurology. They noted no facial abnormalities with mild dysarthria. They felt that her symptoms at that time were not likely neurologic. She was discharged home.    Other significant history includes asthma, anemia, prior pancreatitis x6 drug induced, prior breast cancer status post lumpectomy, hysterectomy, prior mono infection in 2022, treated at an outlying facility for food impaction with EGD and positive H. pylori.    She did undergo MRI of the brain without contrast on 01/01/2024 showing  "chronic small vessel ischemic changes. No acute intracranial abnormalities. CTA of the head and neck showed patent bilateral carotid arteries and patent bilateral vertebral arteries. No central intracranial large vessel occlusions noted.    It appears on 2024, she was evaluated by PCP clinic with concerns of a diagnosis of right-sided Bell's palsy with right facial weakness. They treated her with intramuscular steroids. She was referred by her primary care provider, Vikas Jimenes PA-C.    Today, she reports she is doing well. She confirms she was seen in the hospital recently for trouble with her speech and thickness of her tongue. She states they thought she was having a stroke, but this was ruled out. She currently has a sinus infection and bronchitis. She notes they ruled out stroke, MS, and ALS. She reports her doctor believed she has Bell's palsy and was treated for this because the right lower side of her mouth does not move. She states nothing else developed, so now they do not believe it is Bell's palsy. Her eyelid is not drooping. She denies any weakness anywhere else, numbness, or tingling. She states she feels good overall. She notes she is talking perfectly now, but her voice is hoarse due to the bronchitis. She states when she is slurring her words, it feels like her tongue is in the way. When she is talking normally, she cannot feel it.    She reports she \"can do everything\" except her voice. She denies any fasciculations or atrophy. She denies any shortness of breath, even with her bronchitis. She states her voice changed in the middle of 2023. She states her voice was \"so bad\" when she went to the hospital, and her family insisted she go because they thought she was having a stroke. She has never completed speech therapy. She has noticed when she is stressed or tired, she slurs more. Her mother and all of her mother's siblings  with a stroke. She notes she is very active. She denies " "visual changes.     She did not undergo a nerve and muscle study.     She states Vikas Jimenes PA-C, wants her to undergo a barium swallow study, but she does not have major trouble swallowing now. She reports she eats and drinks fine. She does not get \"strangled,\" and she does not get choked eating normal foods. She has always had trouble taking pills, but she can still take them unless they are large pills. She reports she has eaten Milk Duds her entire life, but she can no longer eat them. She cannot swallow them because she gets choked on them and has to spit them out. She confirms she had an EGD and was told she had severe gastroesophageal reflux. She states she got a piece of chicken impacted 2 to 3 months ago, and they stretched her esophagus and \"did a couple of biopsies.\" She was told she has H. pylori and will follow up with Dr. Soliman, gastroenterology, on 01/24/2024. She was told that she is allergic to wheat and fish. She does not like fish, but she stopped eating anything with wheat flour. She is currently eating a gluten-free diet.    She notes she has arthritis, and her knees have been aching. She denies any trouble with balance. She states she has stairs that she goes up while holding laundry.    She reports she has noticed that her left hand shakes. She notes for 2 to 3 days, she was polishing her nails, and she could polish her eft hand fine, but when she tried to do her right hand, the left hand would \"shake uncontrollably.\" She states she did not shake this morning. She has been using nail stickers instead of polish, so she has not had to be as focused. The nail stickers started peeling, so she started using polish, and that is when she noticed the shaking.    She has a family history of ALS in her brother who passed away when he was 55 years old. She states his ALS was caused by a severe closed head injury. She states her brother's first symptom of ALS was falling. She notes he would \"fall back " "like a tree.\" She reports when they tested her at the hospital, they ruled out ALS. Her other brother, who is 71 years old, has Lewy body dementia.     The following portions of the patient's history were reviewed and updated as appropriate: allergies, current medications, past family history, past medical history, past social history, past surgical history, and problem list.    Past Medical History:   Diagnosis Date   • A-fib    • Anemia    • Asthma    • Breast cancer 2012    LEFT   • Gestational diabetes    • Hx of radiation therapy 2012    LEFT BREAST CANCER   • Hyperlipidemia    • Kidney stone    • Osteoarthritis    • PONV (postoperative nausea and vomiting)    • Varicella        Past Surgical History:   Procedure Laterality Date   • BREAST EXCISIONAL BIOPSY Right 2011   • BREAST LUMPECTOMY Left 2012   • HYSTEROSCOPY     • TOTAL ABDOMINAL HYSTERECTOMY WITH SALPINGO OOPHORECTOMY  2012    enlarged uterus due to tamoxifen reaction   • WISDOM TOOTH EXTRACTION         Social History     Socioeconomic History   • Marital status:    Tobacco Use   • Smoking status: Never   • Smokeless tobacco: Never   Vaping Use   • Vaping Use: Never used   Substance and Sexual Activity   • Alcohol use: No   • Drug use: Never   • Sexual activity: Yes     Partners: Male     Birth control/protection: Post-menopausal       Family History   Problem Relation Age of Onset   • Diabetes Mother    • Colon cancer Maternal Grandfather    • Colon cancer Brother    • Melanoma Sister    • Breast cancer Neg Hx    • Ovarian cancer Neg Hx           Current Outpatient Medications:   •  fluticasone (FLONASE) 50 MCG/ACT nasal spray, 2 sprays into the nostril(s) as directed by provider Daily., Disp: , Rfl:   •  LORATADINE ALLERGY RELIEF PO, Take  by mouth., Disp: , Rfl:   •  pantoprazole (PROTONIX) 40 MG EC tablet, Take 1 tablet by mouth Daily., Disp: , Rfl:      Review of Systems   Constitutional:  Negative for activity change, chills, diaphoresis, " "fatigue, fever and unexpected weight change.   HENT:  Positive for congestion, postnasal drip, rhinorrhea, sinus pressure, sinus pain, sore throat, trouble swallowing and voice change. Negative for dental problem, drooling, ear discharge, ear pain, facial swelling, hearing loss, mouth sores, nosebleeds, sneezing and tinnitus.         Being treated for sinus infection and bronchitis currently, wearing mask today   Eyes:  Negative for photophobia, pain, discharge, redness, itching and visual disturbance.   Respiratory:  Positive for cough and choking. Negative for apnea, chest tightness, shortness of breath, wheezing and stridor.    Cardiovascular: Negative.    Gastrointestinal: Negative.         Reflux   Endocrine: Negative.    Genitourinary: Negative.    Musculoskeletal:  Positive for arthralgias. Negative for back pain, gait problem, joint swelling, myalgias, neck pain and neck stiffness.   Allergic/Immunologic: Negative.    Neurological:  Positive for tremors, speech difficulty and weakness (facial only). Negative for dizziness, seizures, syncope, facial asymmetry, light-headedness, numbness and headaches.   Psychiatric/Behavioral:  Negative for agitation, behavioral problems, confusion, decreased concentration, dysphoric mood, hallucinations, self-injury, sleep disturbance and suicidal ideas. The patient is nervous/anxious. The patient is not hyperactive.    All other systems reviewed and are negative.       Objective:  /80   Pulse 116   Ht 160 cm (63\")   Wt 70.8 kg (156 lb)   SpO2 98%   BMI 27.63 kg/m²     Neurologic Exam     Mental Status   Oriented to person, place, and time.   Speech: slurred (hypophonia)  Level of consciousness: alert  Knowledge: good.   Normal comprehension.     Cranial Nerves     CN II   Visual fields full to confrontation.     CN III, IV, VI   Pupils are equal, round, and reactive to light.  Extraocular motions are normal.     CN V   Facial sensation intact.     CN VII   Right " facial weakness: central (right lower facial droop, otherwise fully intact)    CN VIII   CN VIII normal.     CN IX, X   CN IX normal.   CN X normal.     CN XI   CN XI normal.     CN XII   CN XII normal.   Tongue: not atrophic  Fasciculations: absent  Tongue deviation: none    Motor Exam   Muscle bulk: normal  Overall muscle tone: normal    Strength   Strength 5/5 throughout.   No fasciculations noted     Sensory Exam   Light touch normal.   Vibration normal.   Proprioception normal.   Pinprick normal.     Gait, Coordination, and Reflexes     Gait  Gait: normal    Coordination   Romberg: negative  Finger to nose coordination: normal  Heel to shin coordination: normal  Tandem walking coordination: normal    Tremor   Resting tremor: absent  Intention tremor: present (mild BUE fine kinetic hand tremors)  Action tremor: absent    Reflexes   Reflexes 2+ except as noted.   Right : 2+  Left : 2+  Right plantar: normal  Right Teresa: absent  Left Teresa: absent  Right ankle clonus: absent  Left ankle clonus: absent  Right pendular knee jerk: absent  Left pendular knee jerk: absent  Normal finger and heel taps, no cogwheel rigidity, no resting tremors       Physical Exam  Constitutional:       Appearance: Normal appearance.   Eyes:      Extraocular Movements: EOM normal.      Pupils: Pupils are equal, round, and reactive to light.   Cardiovascular:      Rate and Rhythm: Tachycardia present.   Pulmonary:      Effort: Pulmonary effort is normal.   Neurological:      Mental Status: She is alert and oriented to person, place, and time.      Motor: Motor strength is normal.     Coordination: Finger-Nose-Finger Test, Heel to Shin Test and Romberg Test normal.      Gait: Gait is intact. Tandem walk normal.   Psychiatric:         Attention and Perception: Attention and perception normal.         Mood and Affect: Mood is anxious.         Speech: Speech is slurred.         Behavior: Behavior normal.         Thought Content:  Thought content normal.         Cognition and Memory: Cognition and memory normal.         Judgment: Judgment normal.       Assessment/Plan:       Diagnoses and all orders for this visit:    1. Dysarthria (Primary)  -     Ambulatory Referral to ENT (Otolaryngology)  -     EMG & Nerve Conduction Test; Future  -     Ambulatory Referral to Speech Therapy  -     CK; Future  -     Methylmalonic Acid, Serum; Future  -     Calcium, Ionized; Future  -     PTH, Intact & Calcium; Future    2. Hypophonia with hoarseness  -     Ambulatory Referral to ENT (Otolaryngology)  -     EMG & Nerve Conduction Test; Future  -     Ambulatory Referral to Speech Therapy    3. Facial droop  Comments:  right lower, ? minimal Bell's Pasly but uncertain  Orders:  -     Ambulatory Referral to ENT (Otolaryngology)  -     EMG & Nerve Conduction Test; Future  -     Ambulatory Referral to Speech Therapy    4. Family hx of ALS (amyotrophic lateral sclerosis)  -     Ambulatory Referral to ENT (Otolaryngology)  -     EMG & Nerve Conduction Test; Future  -     Ambulatory Referral to Speech Therapy         Initially, she did not report any significant family history of neurological conditions; however, upon further questioning, she did explain that her brother passed away in his early 50s from ALS, and her other brother, who is 71, has Lewy body dementia.     Besides her right lower facial droop and the marked dysarthria/dysphonia, she has no other abnormalities on exam except for a very mild essential tremor. With her family history, I would like her to be referred to  ENT speech clinic. Also, I would like her to be seen by their speech therapy department at . Also, EMG and NCVs to evaluate for ALS. A few labs today. I am going to discuss her case with our neurologist, and if they have any additional recommendations, they will let me know.     Her vitamin B12 was in the 200s pg/mL in the hospital. We are checking methylmalonic acid level today. We  will see if she would benefit from vitamin B12 injections, which she could receive with her PCP, and I would go ahead and recommend those for now.     We are going to follow up in 03/2024 for reevaluation of symptoms or sooner if needed. Consideration of referral to [facility] for consultation. She wants to wait for additional work-up for now. She verbalizes understanding and agrees with plan moving forward today. Continue with GI specialist. Call with any questions or concerns prior to follow up in clinic.    Total time of visit today was 40 minutes including reviewing hospital records, neurology notes, neuroimaging, obtaining history from patient, completing exam, discussing findings and recommendations in detail.    Reviewed medications, potential side effects and signs and symptoms to report. Discussed risk versus benefits of treatment plan with patient and/or family-including medications, labs and radiology that may be ordered. Addressed questions and concerns during visit. Patient and/or family verbalized understanding and agree with plan.    Following her visit, I discussed her case in detail with Dr. Jonnathan Atkins MD, Neurology, and he recommended the addition of Myasthenia Gravis Panel. We will be contacting her to get this lab completed the next time she is in [place] and at a [facility]. I have already place MG Panel, NMO, Anti-Mog & Musk Antibody orders. He agreed with the remainder of the workup ordered. He recommended waiting on EMG/NCVS results and if abnormal, then refer to [facility].     During this visit the following were done:  Labs Reviewed [x]    Labs Ordered [x]    Radiology Reports Reviewed [x]    Radiology Ordered []    PCP Records Reviewed [x]    Referring Provider Records Reviewed [x]    ER Records Reviewed [x]    Hospital Records Reviewed [x]    History Obtained From Family []    Radiology Images Reviewed [x]    Other Reviewed [x]    Records Requested []      Transcribed from  ambient dictation for Shantell Acosta, APRN by Jaja Kirby.  01/19/24   14:16 EST    Patient or patient representative verbalized consent to the visit recording.  I have personally performed the services described in this document as transcribed by the above individual, and it is both accurate and complete.  Shantell Acosta, APRKELSY  1/19/2024  22:28 EST    Note to patient: The 21st Century Cures Act makes medical notes like these available to patients in the interest of transparency. However, be advised this is a medical document. It is intended as peer to peer communication. It is written in medical language and may contain abbreviations or verbiage that are unfamiliar. It may appear blunt or direct. Medical documents are intended to carry relevant information, facts as evident, and the clinical opinion of the provider.

## 2024-01-19 NOTE — PROGRESS NOTES
Subjective:     Patient ID: Jackelyn Diaz is a 63 y.o. female.    CC:   Chief Complaint   Patient presents with    Speech Problem     Started before mitra       HPI:   History of Present Illness  Today 1/19/2024-  This is a very pleasant 63-year-old female who was recently hospitalized from 01/01/2024 to 01/02/2024 at [facility] for a sensation of her tongue feeling large resulting in biting her tongue multiple times, dysarthria, and family was worried about her having a stroke. She was admitted for stroke evaluation, which was unrevealing. MRI of the brain was negative for acute process. They did not feel this was an anaphylactic reaction. They did discuss possibly using steroids, but she was hesitant with her prior pancreatitis. She did have profound GERD reflux symptoms. They recommended she obtain work-up for treatment of those symptoms. She was evaluated inpatient by neurology. They noted no facial abnormalities with mild dysarthria. They felt that her symptoms at that time were not likely neurologic. She was discharged home.    Other significant history includes asthma, anemia, prior pancreatitis x6 drug induced, prior breast cancer status post lumpectomy, hysterectomy, prior mono infection in 2022, treated at an outlying facility for food impaction with EGD and positive H. pylori.    She did undergo MRI of the brain without contrast on 01/01/2024 showing chronic small vessel ischemic changes. No acute intracranial abnormalities. CTA of the head and neck showed patent bilateral carotid arteries and patent bilateral vertebral arteries. No central intracranial large vessel occlusions noted.    It appears on 01/03/2024, she was evaluated by PCP clinic with concerns of a diagnosis of right-sided Bell's palsy with right facial weakness. They treated her with intramuscular steroids. She was referred by her primary care provider, Vikas Jimenes PA-C.    Today, she reports she is doing well. She  "confirms she was seen in the hospital recently for trouble with her speech and thickness of her tongue. She states they thought she was having a stroke, but this was ruled out. She currently has a sinus infection and bronchitis. She notes they ruled out stroke, MS, and ALS. She reports her doctor believed she has Bell's palsy and was treated for this because the right lower side of her mouth does not move. She states nothing else developed, so now they do not believe it is Bell's palsy. Her eyelid is not drooping. She denies any weakness anywhere else, numbness, or tingling. She states she feels good overall. She notes she is talking perfectly now, but her voice is hoarse due to the bronchitis. She states when she is slurring her words, it feels like her tongue is in the way. When she is talking normally, she cannot feel it.    She reports she \"can do everything\" except her voice. She denies any fasciculations or atrophy. She denies any shortness of breath, even with her bronchitis. She states her voice changed in the middle of 2023. She states her voice was \"so bad\" when she went to the hospital, and her family insisted she go because they thought she was having a stroke. She has never completed speech therapy. She has noticed when she is stressed or tired, she slurs more. Her mother and all of her mother's siblings  with a stroke. She notes she is very active. She denies visual changes.     She did not undergo a nerve and muscle study.     She states Vikas Jimenes PA-C, wants her to undergo a barium swallow study, but she does not have major trouble swallowing now. She reports she eats and drinks fine. She does not get \"strangled,\" and she does not get choked eating normal foods. She has always had trouble taking pills, but she can still take them unless they are large pills. She reports she has eaten Milk Duds her entire life, but she can no longer eat them. She cannot swallow them because she gets choked " "on them and has to spit them out. She confirms she had an EGD and was told she had severe gastroesophageal reflux. She states she got a piece of chicken impacted 2 to 3 months ago, and they stretched her esophagus and \"did a couple of biopsies.\" She was told she has H. pylori and will follow up with Dr. Soliman, gastroenterology, on 01/24/2024. She was told that she is allergic to wheat and fish. She does not like fish, but she stopped eating anything with wheat flour. She is currently eating a gluten-free diet.    She notes she has arthritis, and her knees have been aching. She denies any trouble with balance. She states she has stairs that she goes up while holding laundry.    She reports she has noticed that her left hand shakes. She notes for 2 to 3 days, she was polishing her nails, and she could polish her eft hand fine, but when she tried to do her right hand, the left hand would \"shake uncontrollably.\" She states she did not shake this morning. She has been using nail stickers instead of polish, so she has not had to be as focused. The nail stickers started peeling, so she started using polish, and that is when she noticed the shaking.    She has a family history of ALS in her brother who passed away when he was 55 years old. She states his ALS was caused by a severe closed head injury. She states her brother's first symptom of ALS was falling. She notes he would \"fall back like a tree.\" She reports when they tested her at the hospital, they ruled out ALS. Her other brother, who is 71 years old, has Lewy body dementia.     The following portions of the patient's history were reviewed and updated as appropriate: allergies, current medications, past family history, past medical history, past social history, past surgical history, and problem list.    Past Medical History:   Diagnosis Date    A-fib     Anemia     Asthma     Breast cancer 2012    LEFT    Gestational diabetes     Hx of radiation therapy 2012    " LEFT BREAST CANCER    Hyperlipidemia     Kidney stone     Osteoarthritis     PONV (postoperative nausea and vomiting)     Varicella        Past Surgical History:   Procedure Laterality Date    BREAST EXCISIONAL BIOPSY Right 2011    BREAST LUMPECTOMY Left 2012    HYSTEROSCOPY      TOTAL ABDOMINAL HYSTERECTOMY WITH SALPINGO OOPHORECTOMY  2012    enlarged uterus due to tamoxifen reaction    WISDOM TOOTH EXTRACTION         Social History     Socioeconomic History    Marital status:    Tobacco Use    Smoking status: Never    Smokeless tobacco: Never   Vaping Use    Vaping Use: Never used   Substance and Sexual Activity    Alcohol use: No    Drug use: Never    Sexual activity: Yes     Partners: Male     Birth control/protection: Post-menopausal       Family History   Problem Relation Age of Onset    Diabetes Mother     Colon cancer Maternal Grandfather     Colon cancer Brother     Melanoma Sister     Breast cancer Neg Hx     Ovarian cancer Neg Hx           Current Outpatient Medications:     fluticasone (FLONASE) 50 MCG/ACT nasal spray, 2 sprays into the nostril(s) as directed by provider Daily., Disp: , Rfl:     LORATADINE ALLERGY RELIEF PO, Take  by mouth., Disp: , Rfl:     pantoprazole (PROTONIX) 40 MG EC tablet, Take 1 tablet by mouth Daily., Disp: , Rfl:      Review of Systems   Constitutional:  Negative for activity change, chills, diaphoresis, fatigue, fever and unexpected weight change.   HENT:  Positive for congestion, postnasal drip, rhinorrhea, sinus pressure, sinus pain, sore throat, trouble swallowing and voice change. Negative for dental problem, drooling, ear discharge, ear pain, facial swelling, hearing loss, mouth sores, nosebleeds, sneezing and tinnitus.         Being treated for sinus infection and bronchitis currently, wearing mask today   Eyes:  Negative for photophobia, pain, discharge, redness, itching and visual disturbance.   Respiratory:  Positive for cough and choking. Negative for apnea,  "chest tightness, shortness of breath, wheezing and stridor.    Cardiovascular: Negative.    Gastrointestinal: Negative.         Reflux   Endocrine: Negative.    Genitourinary: Negative.    Musculoskeletal:  Positive for arthralgias. Negative for back pain, gait problem, joint swelling, myalgias, neck pain and neck stiffness.   Allergic/Immunologic: Negative.    Neurological:  Positive for tremors, speech difficulty and weakness (facial only). Negative for dizziness, seizures, syncope, facial asymmetry, light-headedness, numbness and headaches.   Psychiatric/Behavioral:  Negative for agitation, behavioral problems, confusion, decreased concentration, dysphoric mood, hallucinations, self-injury, sleep disturbance and suicidal ideas. The patient is nervous/anxious. The patient is not hyperactive.    All other systems reviewed and are negative.       Objective:  /80   Pulse 116   Ht 160 cm (63\")   Wt 70.8 kg (156 lb)   SpO2 98%   BMI 27.63 kg/m²     Neurologic Exam     Mental Status   Oriented to person, place, and time.   Speech: slurred (hypophonia)  Level of consciousness: alert  Knowledge: good.   Normal comprehension.     Cranial Nerves     CN II   Visual fields full to confrontation.     CN III, IV, VI   Pupils are equal, round, and reactive to light.  Extraocular motions are normal.     CN V   Facial sensation intact.     CN VII   Right facial weakness: central (right lower facial droop, otherwise fully intact)    CN VIII   CN VIII normal.     CN IX, X   CN IX normal.   CN X normal.     CN XI   CN XI normal.     CN XII   CN XII normal.   Tongue: not atrophic  Fasciculations: absent  Tongue deviation: none    Motor Exam   Muscle bulk: normal  Overall muscle tone: normal    Strength   Strength 5/5 throughout.   No fasciculations noted     Sensory Exam   Light touch normal.   Vibration normal.   Proprioception normal.   Pinprick normal.     Gait, Coordination, and Reflexes     Gait  Gait: " normal    Coordination   Romberg: negative  Finger to nose coordination: normal  Heel to shin coordination: normal  Tandem walking coordination: normal    Tremor   Resting tremor: absent  Intention tremor: present (mild BUE fine kinetic hand tremors)  Action tremor: absent    Reflexes   Reflexes 2+ except as noted.   Right : 2+  Left : 2+  Right plantar: normal  Right Teresa: absent  Left Teresa: absent  Right ankle clonus: absent  Left ankle clonus: absent  Right pendular knee jerk: absent  Left pendular knee jerk: absent  Normal finger and heel taps, no cogwheel rigidity, no resting tremors       Physical Exam  Constitutional:       Appearance: Normal appearance.   Eyes:      Extraocular Movements: EOM normal.      Pupils: Pupils are equal, round, and reactive to light.   Cardiovascular:      Rate and Rhythm: Tachycardia present.   Pulmonary:      Effort: Pulmonary effort is normal.   Neurological:      Mental Status: She is alert and oriented to person, place, and time.      Motor: Motor strength is normal.     Coordination: Finger-Nose-Finger Test, Heel to Shin Test and Romberg Test normal.      Gait: Gait is intact. Tandem walk normal.   Psychiatric:         Attention and Perception: Attention and perception normal.         Mood and Affect: Mood is anxious.         Speech: Speech is slurred.         Behavior: Behavior normal.         Thought Content: Thought content normal.         Cognition and Memory: Cognition and memory normal.         Judgment: Judgment normal.       Assessment/Plan:       Diagnoses and all orders for this visit:    1. Dysarthria (Primary)  -     Ambulatory Referral to ENT (Otolaryngology)  -     EMG & Nerve Conduction Test; Future  -     Ambulatory Referral to Speech Therapy  -     CK; Future  -     Methylmalonic Acid, Serum; Future  -     Calcium, Ionized; Future  -     PTH, Intact & Calcium; Future    2. Hypophonia with hoarseness  -     Ambulatory Referral to ENT  (Otolaryngology)  -     EMG & Nerve Conduction Test; Future  -     Ambulatory Referral to Speech Therapy    3. Facial droop  Comments:  right lower, ? minimal Bell's Pasly but uncertain  Orders:  -     Ambulatory Referral to ENT (Otolaryngology)  -     EMG & Nerve Conduction Test; Future  -     Ambulatory Referral to Speech Therapy    4. Family hx of ALS (amyotrophic lateral sclerosis)  -     Ambulatory Referral to ENT (Otolaryngology)  -     EMG & Nerve Conduction Test; Future  -     Ambulatory Referral to Speech Therapy         Initially, she did not report any significant family history of neurological conditions; however, upon further questioning, she did explain that her brother passed away in his early 50s from ALS, and her other brother, who is 71, has Lewy body dementia.     Besides her right lower facial droop and the marked dysarthria/dysphonia, she has no other abnormalities on exam except for a very mild essential tremor. With her family history, I would like her to be referred to  ENT speech clinic. Also, I would like her to be seen by their speech therapy department at . Also, EMG and NCVs to evaluate for ALS. A few labs today. I am going to discuss her case with our neurologist, and if they have any additional recommendations, they will let me know.     Her vitamin B12 was in the 200s pg/mL in the hospital. We are checking methylmalonic acid level today. We will see if she would benefit from vitamin B12 injections, which she could receive with her PCP, and I would go ahead and recommend those for now.     We are going to follow up in 03/2024 for reevaluation of symptoms or sooner if needed. Consideration of referral to [facility] for consultation. She wants to wait for additional work-up for now. She verbalizes understanding and agrees with plan moving forward today. Continue with GI specialist. Call with any questions or concerns prior to follow up in clinic.    Total time of visit today was  40 minutes including reviewing hospital records, neurology notes, neuroimaging, obtaining history from patient, completing exam, discussing findings and recommendations in detail.    Reviewed medications, potential side effects and signs and symptoms to report. Discussed risk versus benefits of treatment plan with patient and/or family-including medications, labs and radiology that may be ordered. Addressed questions and concerns during visit. Patient and/or family verbalized understanding and agree with plan.    Following her visit, I discussed her case in detail with Dr. Jonnathan Atkins MD, Neurology, and he recommended the addition of Myasthenia Gravis Panel. We will be contacting her to get this lab completed the next time she is in [place] and at a [facility]. I have already place MG Panel, NMO, Anti-Mog & Musk Antibody orders. He agreed with the remainder of the workup ordered. He recommended waiting on EMG/NCVS results and if abnormal, then refer to [facility].     During this visit the following were done:  Labs Reviewed [x]    Labs Ordered [x]    Radiology Reports Reviewed [x]    Radiology Ordered []    PCP Records Reviewed [x]    Referring Provider Records Reviewed [x]    ER Records Reviewed [x]    Hospital Records Reviewed [x]    History Obtained From Family []    Radiology Images Reviewed [x]    Other Reviewed [x]    Records Requested []      Transcribed from ambient dictation for DUTCH Shanks by Jaja Kirby.  01/19/24   14:16 EST    Patient or patient representative verbalized consent to the visit recording.  I have personally performed the services described in this document as transcribed by the above individual, and it is both accurate and complete.  DUTCH Shanks  1/19/2024  22:28 EST    Note to patient: The 21st Century Cures Act makes medical notes like these available to patients in the interest of transparency. However, be advised this is a medical document. It  is intended as peer to peer communication. It is written in medical language and may contain abbreviations or verbiage that are unfamiliar. It may appear blunt or direct. Medical documents are intended to carry relevant information, facts as evident, and the clinical opinion of the provider.

## 2024-01-20 NOTE — TELEPHONE ENCOUNTER
"Please let Jackelyn know that all of her labs so far are normal. I did discuss her case with my 2 of our Neurology MDs and they recommended 1 additional set of labs. If she can get those labs done the next time she is in [place], I can put those in and she can get them done at our diagnostic center. If she wants us to mail them to her home, she may be able to get them done there, however, I am not certain. See if she is ok with coming to [place] at her convenience-this could be done the same day she gets the EMG/NCVS \"nerve and muscle study\" done at the Hospital. Any [facility] she can go to-the orders are visible to all. Make sure this is ok and let me know. I did go ahead and place the lab orders so they are active. Fax labs to PCP. Thanks, DUTCH Easton   "

## 2024-01-20 NOTE — PROGRESS NOTES
"Please let Jackelyn know that all of her labs so far are normal. I did discuss her case with my 2 of our Neurology MDs and they recommended 1 additional set of labs. If she can get those labs done the next time she is in [place], I can put those in and she can get them done at our diagnostic center. If she wants us to mail them to her home, she may be able to get them done there, however, I am not certain. See if she is ok with coming to [place] at her convenience-this could be done the same day she gets the EMG/NCVS \"nerve and muscle study\" done at the Hospital. Any [facility] she can go to-the orders are visible to all. Make sure this is ok and let me know. I did go ahead and place the lab orders so they are active. Fax labs to PCP. Thanks, DUTCH Easton"

## 2024-01-23 ENCOUNTER — TELEPHONE (OUTPATIENT)
Dept: NEUROLOGY | Facility: CLINIC | Age: 64
End: 2024-01-23
Payer: COMMERCIAL

## 2024-01-23 NOTE — TELEPHONE ENCOUNTER
I SPOKE WITH PT AND GAVE RESULTS AND RECOMMENDATIONS FOR MORE LABS TO BE DRAWN PER PROVIDER REQUEST. SHE IS ABLE TO HAVE THOSE DRAWN THIS THURSDAY AT [facility].  FAXED RESULTS TO PCP

## 2024-01-23 NOTE — TELEPHONE ENCOUNTER
"----- Message from DUTCH Shanks sent at 1/19/2024  8:37 PM EST -----  Please let Jackelyn know that all of her labs so far are normal. I did discuss her case with my 2 of our Neurology MDs and they recommended 1 additional set of labs. If she can get those labs done the next time she is in [place], I can put those in and she can get them done at our diagnostic center. If she wants us to mail them to her home, she may be able to get them done there, however, I am not certain. See if she is ok with coming to [place] at her convenience-this could be done the same day she gets the EMG/NCVS \"nerve and muscle study\" done at the Hospital. Any [facility] she can go to-the orders are visible to all. Make sure this is ok and let me know. I did go ahead and place the lab orders so they are active. Fax labs to PCP. Thanks, DUTCH Easton  "

## 2024-01-24 LAB — METHYLMALONATE SERPL-SCNC: 339 NMOL/L (ref 0–378)

## 2024-01-25 ENCOUNTER — PATIENT ROUNDING (BHMG ONLY) (OUTPATIENT)
Dept: NEUROLOGY | Facility: CLINIC | Age: 64
End: 2024-01-25
Payer: COMMERCIAL

## 2024-01-25 ENCOUNTER — LAB (OUTPATIENT)
Dept: LAB | Facility: HOSPITAL | Age: 64
End: 2024-01-25
Payer: COMMERCIAL

## 2024-01-25 DIAGNOSIS — R29.810 FACIAL DROOP: ICD-10-CM

## 2024-01-25 DIAGNOSIS — R47.1 DYSARTHRIA: ICD-10-CM

## 2024-01-25 DIAGNOSIS — R13.10 DYSPHAGIA, UNSPECIFIED TYPE: ICD-10-CM

## 2024-01-25 PROCEDURE — 83519 RIA NONANTIBODY: CPT

## 2024-01-25 PROCEDURE — 86255 FLUORESCENT ANTIBODY SCREEN: CPT

## 2024-01-25 PROCEDURE — 86053 AQAPRN-4 ANTB FLO CYTMTRY EA: CPT

## 2024-01-25 PROCEDURE — 36415 COLL VENOUS BLD VENIPUNCTURE: CPT

## 2024-01-25 PROCEDURE — 86362 MOG-IGG1 ANTB CBA EACH: CPT

## 2024-01-29 LAB — MOG AB SER QL CBA IFA: NEGATIVE

## 2024-01-30 LAB — MUSK AB SER IA-ACNC: <1 U/ML

## 2024-01-31 ENCOUNTER — TELEPHONE (OUTPATIENT)
Dept: NEUROLOGY | Facility: CLINIC | Age: 64
End: 2024-01-31
Payer: COMMERCIAL

## 2024-01-31 NOTE — TELEPHONE ENCOUNTER
----- Message from DUTCH Shanks sent at 1/31/2024 10:27 AM EST -----  Please let Jackelyn know that her labs continue to remain normal. We are waiting on a few more to return. We will keep her posted on those results. Thanks, DUTCH Easton

## 2024-02-01 LAB — AQP4 H2O CHANNEL IGG SERPL QL: NEGATIVE

## 2024-02-02 ENCOUNTER — TELEPHONE (OUTPATIENT)
Dept: NEUROLOGY | Facility: CLINIC | Age: 64
End: 2024-02-02
Payer: COMMERCIAL

## 2024-02-02 NOTE — TELEPHONE ENCOUNTER
----- Message from DUTCH Shanks sent at 2/1/2024  4:07 PM EST -----  Labs continue to remain normal so far which is reassuring. We will see what her EMG/NCVS shows. I do not see where she has been scheduled for this yet. Can you please check on that. I ordered it for ASAP on 1/19/2024 so it should have been scheduled. Thanks, DUTCH Easton

## 2024-02-02 NOTE — TELEPHONE ENCOUNTER
Pt is aware of the lab results and let pt know the EMG is going through insurance and when approved will give pt call to schedule

## 2024-02-09 ENCOUNTER — TELEPHONE (OUTPATIENT)
Dept: NEUROLOGY | Facility: CLINIC | Age: 64
End: 2024-02-09
Payer: COMMERCIAL

## 2024-02-10 LAB
ACHR BIND AB SER-SCNC: <0.03 NMOL/L (ref 0–0.24)
ACHR BLOCK AB SER-ACNC: 10 % (ref 0–25)
ACHR MOD AB SER QL FC: 4 % (ref 0–45)
MUSK AB SER IA-ACNC: <1 U/ML
REFLEX INFORMATION: NORMAL
STRIA MUS AB TITR SER IF: NEGATIVE {TITER}

## 2024-02-12 ENCOUNTER — TELEPHONE (OUTPATIENT)
Dept: NEUROLOGY | Facility: CLINIC | Age: 64
End: 2024-02-12
Payer: COMMERCIAL

## 2024-03-28 ENCOUNTER — OFFICE VISIT (OUTPATIENT)
Dept: NEUROLOGY | Facility: CLINIC | Age: 64
End: 2024-03-28
Payer: COMMERCIAL

## 2024-03-28 VITALS
DIASTOLIC BLOOD PRESSURE: 90 MMHG | BODY MASS INDEX: 27.82 KG/M2 | SYSTOLIC BLOOD PRESSURE: 132 MMHG | OXYGEN SATURATION: 94 % | HEART RATE: 104 BPM | HEIGHT: 63 IN | WEIGHT: 157 LBS

## 2024-03-28 DIAGNOSIS — M26.629 TMJ SYNDROME: ICD-10-CM

## 2024-03-28 DIAGNOSIS — R51.9 RIGHT FACIAL PAIN: ICD-10-CM

## 2024-03-28 DIAGNOSIS — R47.1 DYSARTHRIA: Primary | ICD-10-CM

## 2024-03-28 DIAGNOSIS — R29.810 FACIAL DROOP: ICD-10-CM

## 2024-03-28 PROBLEM — A04.8 H. PYLORI INFECTION: Status: ACTIVE | Noted: 2024-01-25

## 2024-03-28 PROBLEM — K20.0 EOSINOPHILIC ESOPHAGITIS: Status: ACTIVE | Noted: 2024-01-25

## 2024-03-28 PROBLEM — R49.0 DYSPHONIA: Status: ACTIVE | Noted: 2024-02-08

## 2024-03-28 PROCEDURE — 99214 OFFICE O/P EST MOD 30 MIN: CPT | Performed by: NURSE PRACTITIONER

## 2024-03-28 RX ORDER — BACLOFEN 10 MG/1
10 TABLET ORAL 2 TIMES DAILY PRN
Qty: 60 TABLET | Refills: 6 | Status: SHIPPED | OUTPATIENT
Start: 2024-03-28 | End: 2025-03-28

## 2024-03-28 NOTE — LETTER
March 28, 2024     GISELA Loepz  [address] [facility] [address] [address]    Patient: Jackelyn Diaz   YOB: 1960   Date of Visit: 3/28/2024       Dear GISELA Lopez    Jackelyn Diaz was in my office today. Below is a copy of my note.    If you have questions, please do not hesitate to call me. I look forward to following Jackelyn along with you.         Sincerely,        DUTCH Shanks        CC: Sravan Noonan MD    Subjective:     Patient ID: Jackelyn Diaz is a 63 y.o. female.    CC:   Chief Complaint   Patient presents with   • Dysarthria   • Temporomandibular Joint Pain       HPI:   History of Present Illness  Today 3/28/2024-  This is a pleasant 63-year-old female who presents for 3-month neurology follow-up. I initially saw her in clinic on 01/19/2024, that was after [facility] admission for symptoms of her tongue feeling large, dysarthria, and concerns of stroke. Her stroke evaluation was unrevealing. She had a right lower facial droop. Otherwise, neurological exam was intact with no fasciculations.    She did have some note of hypophonia and dysarthria during that visit. I did place referral to ear, nose, and throat specialist. She was evaluated by Dr. Sravan Noonan at  ENT on 02/08/2024. He evaluated her and felt that she did not have any laryngeal voice issues. He thought that she had more dysarthria than dysphonia. He felt that she had a lingering sequelae of tongue injury from 12/2023. He suspected she would likely continue to improve over time.    We did complete some extensive labs on 01/25/2024 including MUSK antibody negative, neuromyelitis optica antibody IgG negative, anti-myelin oligodendrocyte glycoprotein serum negative, myasthenia gravis profile negative, and MUSK antibodies negative.    She is scheduled for EMG and NCVs to be completed on 04/09/2024 with [facility] to rule out ALS. She also was evaluated by Patricia Brown MS, CCC-SLP, with  "speech language pathology at  [facility] on 02/08/2024. She does have a family history of ALS. She is here for reevaluation of her symptoms today.    Today, she reports she is doing \"okay.\" She notes every time she yawns, she can talk regularly for a few seconds before her words slur again. She believes this is related to her jaw. She states Vikas Jimenes PA-C, referred her to a dental office at [facility]. When she opens her mouth, her jaw does not click, but it \"sticks out\" like the size of a marble on the right side only. She had an x-ray and was told it comes out of the joint, but when she closes her mouth, it \"goes back in,\" which is causing the knot. Vikas Jimenes PA-C, and DUTCH [place], referred [facility], but they are \"not having any luck.\" She states  advised she had to be on a soft food diet for 6 weeks before they see her. After she speaks, her jaw feels tired. She has not tried a muscle relaxer. She has used heat and cold. She reports Vikas Jimenes PA-C, prescribed a medication for inflammation. She confirms she clenches at night, but she does not grind her teeth. She has a mouthguard, but she sleepwalks and sometimes wakes up without her mouthguard on. She notes [facility] can refer her to their orofacial pain clinic if she cannot get into .    She confirms she saw Dr. Noonan with ENT. She reports her tongue was normal, but she was walking, sneezed hard, and bit her tongue in December of 2023. She believes that is what \"popped\" her jaw joint and made her voice slurred. She then bit her tongue really hard and the additional symptoms presented. Her tongue improved, but her voice did not. She feels her slurred speech is the same, but it has not gotten worse. She states when she yawns, she can talk for a couple of minutes in a regular voice before she starts slurring again.    She denies any weakness, double vision, or trouble with " "swallowing. She notes her tongue feels thick, but it has felt this way since she bit it. She has no difficulty with eating. She underwent swallowing tests that were normal. She states she may have been numbed as it was not painful, but it was uncomfortable. She was told she did not need to follow up unless she \"had any trouble.\" She can no longer sing without sounding \"drunk.\" She denies Shortness of breath, double vision or fasciculations.    She notes she has a crooked smile at her baseline. Her father and brother also have crooked smiles.    Prior Neurological Workup & History:  Hospitalized from 01/01/2024 to 01/02/2024 at [facility] for a sensation of her tongue feeling large resulting in biting her tongue multiple times, dysarthria, and family was worried about her having a stroke. She was admitted for stroke evaluation, which was unrevealing. MRI of the brain was negative for acute process. They did not feel this was an anaphylactic reaction. They did discuss possibly using steroids, but she was hesitant with her prior pancreatitis. She did have profound GERD reflux symptoms. They recommended she obtain work-up for treatment of those symptoms. She was evaluated inpatient by neurology. They noted no facial abnormalities with mild dysarthria. They felt that her symptoms at that time were not likely neurologic. She was discharged home.    She clarified she bit didi tongue really hard in December 2023 and then the tongue thickness and speech changes began.     Other significant history includes asthma, anemia, prior pancreatitis x6 drug induced, prior breast cancer status post lumpectomy, hysterectomy, prior mono infection in 2022, treated at an [facility] facility for food impaction with EGD and positive H. pylori.     She did undergo MRI of the brain without contrast on 01/01/2024 showing chronic small vessel ischemic changes. No acute intracranial abnormalities. CTA of the head and neck showed " "patent bilateral carotid arteries and patent bilateral vertebral arteries. No central intracranial large vessel occlusions noted.     It appears on 2024, she was evaluated by PCP clinic with concerns of a diagnosis of right-sided Bell's palsy with right facial weakness. They treated her with intramuscular steroids.      She states her voice changed in the middle of 2023.  Her mother and all of her mother's siblings  with a stroke.       She follows with GI for H. Pylori, Dr. Soliman. She is currently eating a gluten-free diet.     Noted some intermittent tremor in left.     She has a family history of ALS in her brother who passed away when he was 55 years old. She states his ALS was caused by a severe closed head injury. She states her brother's first symptom of ALS was falling. She notes he would \"fall back like a tree.\" She reports when they tested her at the hospital, they ruled out ALS. Her other brother, who is 71 years old, has Lewy body dementia.     The following portions of the patient's history were reviewed and updated as appropriate: allergies, current medications, past family history, past medical history, past social history, past surgical history, and problem list.    Past Medical History:   Diagnosis Date   • A-fib    • Anemia    • Asthma    • Breast cancer 2012    LEFT   • Gestational diabetes    • Hx of radiation therapy 2012    LEFT BREAST CANCER   • Hyperlipidemia    • Kidney stone    • Osteoarthritis    • PONV (postoperative nausea and vomiting)    • Varicella        Past Surgical History:   Procedure Laterality Date   • BREAST EXCISIONAL BIOPSY Right    • BREAST LUMPECTOMY Left    • HYSTEROSCOPY     • TOTAL ABDOMINAL HYSTERECTOMY WITH SALPINGO OOPHORECTOMY      enlarged uterus due to tamoxifen reaction   • WISDOM TOOTH EXTRACTION         Social History     Socioeconomic History   • Marital status:    Tobacco Use   • Smoking status: Never   • Smokeless tobacco: " "Never   Vaping Use   • Vaping status: Never Used   Substance and Sexual Activity   • Alcohol use: No   • Drug use: Never   • Sexual activity: Yes     Partners: Male     Birth control/protection: Post-menopausal       Family History   Problem Relation Age of Onset   • Diabetes Mother    • Colon cancer Maternal Grandfather    • Colon cancer Brother    • Melanoma Sister    • Breast cancer Neg Hx    • Ovarian cancer Neg Hx           Current Outpatient Medications:   •  fluticasone (FLONASE) 50 MCG/ACT nasal spray, 2 sprays into the nostril(s) as directed by provider Daily., Disp: , Rfl:   •  LORATADINE ALLERGY RELIEF PO, Take  by mouth., Disp: , Rfl:   •  pantoprazole (PROTONIX) 40 MG EC tablet, Take 1 tablet by mouth Daily., Disp: , Rfl:   •  baclofen (LIORESAL) 10 MG tablet, Take 1 tablet by mouth 2 (Two) Times a Day As Needed for Muscle Spasms., Disp: 60 tablet, Rfl: 6     Review of Systems   HENT:  Positive for dental problem and voice change. Negative for congestion, drooling, ear discharge, ear pain, facial swelling, hearing loss, mouth sores, nosebleeds, postnasal drip, rhinorrhea, sinus pressure, sinus pain, sneezing, sore throat, tinnitus and trouble swallowing.         Right TMJ click and pain   Respiratory:  Negative for shortness of breath.    Cardiovascular:  Negative for chest pain.   Neurological:  Positive for speech difficulty. Negative for weakness and numbness.   All other systems reviewed and are negative.       Objective:  /90   Pulse 104   Ht 160 cm (63\")   Wt 71.2 kg (157 lb)   SpO2 94%   BMI 27.81 kg/m²     Neurologic Exam     Mental Status   Oriented to person, place, and time.   Speech: slurred   Level of consciousness: alert    Cranial Nerves     CN II   Visual fields full to confrontation.     CN III, IV, VI   Pupils are equal, round, and reactive to light.  Extraocular motions are normal.   CN III: no CN III palsy  CN VI: no CN VI palsy  Nystagmus: none   Diplopia: " none  Ophthalmoparesis: none  Upgaze: normal  Downgaze: normal    CN V   Facial sensation intact.     CN VII   Right facial weakness: central (minimal right lower facial droop)  Left facial weakness: none    CN VIII   CN VIII normal.     CN IX, X   CN IX normal.   CN X normal.     CN XI   CN XI normal.     CN XII   CN XII normal.     Motor Exam   Muscle bulk: normal  Overall muscle tone: normal    Strength   Strength 5/5 throughout.     Gait, Coordination, and Reflexes     Gait  Gait: normal    Coordination   Finger to nose coordination: normal    Tremor   Resting tremor: absent  Intention tremor: absent  Action tremor: absent    Reflexes   Reflexes 2+ except as noted.   Right : 2+  Left : 2+      Physical Exam  Constitutional:       Appearance: Normal appearance.   HENT:      Head:      Jaw: Tenderness, swelling, pain on movement (right TMJ click) and malocclusion present.   Eyes:      Extraocular Movements: EOM normal.      Pupils: Pupils are equal, round, and reactive to light.   Neurological:      Mental Status: She is alert and oriented to person, place, and time.      Motor: Motor strength is normal.     Coordination: Finger-Nose-Finger Test normal.      Gait: Gait is intact.   Psychiatric:         Mood and Affect: Mood is anxious.         Speech: Speech is slurred.         Behavior: Behavior normal.         Thought Content: Thought content normal.         Cognition and Memory: Cognition and memory normal.         Judgment: Judgment normal.         Assessment/Plan:       Diagnoses and all orders for this visit:    1. Dysarthria (Primary)  Comments:  awaiting emg/ncvs 4/9/2024    2. Facial droop  Comments:  awaiting emg/ncvs 4/9/2024    3. TMJ syndrome  -     baclofen (LIORESAL) 10 MG tablet; Take 1 tablet by mouth 2 (Two) Times a Day As Needed for Muscle Spasms.  Dispense: 60 tablet; Refill: 6    4. Right facial pain  -     baclofen (LIORESAL) 10 MG tablet; Take 1 tablet by mouth 2 (Two) Times a Day As  Needed for Muscle Spasms.  Dispense: 60 tablet; Refill: 6         She continues to have no numbness, tingling, weakness, confusion, double vision, or loss of vision. She is reporting more TMJ symptoms. She saw her dentist and they state that she has TMJ syndrome. Her PCP recently put her on an anti-inflammatory. [facility] She is pending to be scheduled. If they do not schedule her, she states that her dentist will refer her locally. I am placing her on baclofen to help with her symptoms for now.    Recommend keeping appointment for EMG and NCVs to evaluate for ALS with strong family history.    Otherwise, she is doing well. Right facial droop is minimal today. She is going to follow up in 4 to 5 months for reevaluation of symptoms or sooner if needed. She verbalizes understanding and agrees with plan today.    Reviewed medications, potential side effects and signs and symptoms to report. Discussed risk versus benefits of treatment plan with patient and/or family-including medications, labs and radiology that may be ordered. Addressed questions and concerns during visit. Patient and/or family verbalized understanding and agree with plan.    During this visit the following were done:  Labs Reviewed [x]    Labs Ordered []    Radiology Reports Reviewed [x]    Radiology Ordered []    PCP Records Reviewed []    Referring Provider Records Reviewed []    ER Records Reviewed []    Hospital Records Reviewed []    History Obtained From Family []    Radiology Images Reviewed []    Other Reviewed [x]  ENT and SLP consult notes reviewed  Records Requested []      Transcribed from ambient dictation for DUTCH Shanks by Jaja Kirby.  03/28/24   12:22 EDT    Patient or patient representative verbalized consent to the visit recording.  I have personally performed the services described in this document as transcribed by the above individual, and it is both accurate and  complete.  Shantell Acosta, APRN  3/28/2024  16:30 EDT     Note to patient: The 21st Century Cures Act makes medical notes like these available to patients in the interest of transparency. However, be advised this is a medical document. It is intended as peer to peer communication. It is written in medical language and may contain abbreviations or verbiage that are unfamiliar. It may appear blunt or direct. Medical documents are intended to carry relevant information, facts as evident, and the clinical opinion of the provider.

## 2024-03-28 NOTE — PROGRESS NOTES
"Subjective:     Patient ID: Jackelyn Diaz is a 63 y.o. female.    CC:   Chief Complaint   Patient presents with    Dysarthria    Temporomandibular Joint Pain       HPI:   History of Present Illness  Today 3/28/2024-  This is a pleasant 63-year-old female who presents for 3-month neurology follow-up. I initially saw her in clinic on 01/19/2024, that was after [facility] admission for symptoms of her tongue feeling large, dysarthria, and concerns of stroke. Her stroke evaluation was unrevealing. She had a right lower facial droop. Otherwise, neurological exam was intact with no fasciculations.    She did have some note of hypophonia and dysarthria during that visit. I did place referral to ear, nose, and throat specialist. She was evaluated by Dr. Sravan Noonan at  ENT on 02/08/2024. He evaluated her and felt that she did not have any laryngeal voice issues. He thought that she had more dysarthria than dysphonia. He felt that she had a lingering sequelae of tongue injury from 12/2023. He suspected she would likely continue to improve over time.    We did complete some extensive labs on 01/25/2024 including MUSK antibody negative, neuromyelitis optica antibody IgG negative, anti-myelin oligodendrocyte glycoprotein serum negative, myasthenia gravis profile negative, and MUSK antibodies negative.    She is scheduled for EMG and NCVs to be completed on 04/09/2024 with [facility] to rule out ALS. She also was evaluated by Patricia Brown MS, CCC-SLP, with speech language pathology at  [facility] on 02/08/2024. She does have a family history of ALS. She is here for reevaluation of her symptoms today.    Today, she reports she is doing \"okay.\" She notes every time she yawns, she can talk regularly for a few seconds before her words slur again. She believes this is related to her jaw. She states Vikas Jimenes PA-C, referred her to a dental office at [facility]. When she opens her " "mouth, her jaw does not click, but it \"sticks out\" like the size of a marble on the right side only. She had an x-ray and was told it comes out of the joint, but when she closes her mouth, it \"goes back in,\" which is causing the knot. Vikas Jimenes PA-C, and DUTCH [place], referred [facility], but they are \"not having any luck.\" She states  advised she had to be on a soft food diet for 6 weeks before they see her. After she speaks, her jaw feels tired. She has not tried a muscle relaxer. She has used heat and cold. She reports Vikas Jimenes PA-C, prescribed a medication for inflammation. She confirms she clenches at night, but she does not grind her teeth. She has a mouthguard, but she sleepwalks and sometimes wakes up without her mouthguard on. She notes [facility] can refer her to their orofacial pain clinic if she cannot get into .    She confirms she saw Dr. Noonan with ENT. She reports her tongue was normal, but she was walking, sneezed hard, and bit her tongue in December of 2023. She believes that is what \"popped\" her jaw joint and made her voice slurred. She then bit her tongue really hard and the additional symptoms presented. Her tongue improved, but her voice did not. She feels her slurred speech is the same, but it has not gotten worse. She states when she yawns, she can talk for a couple of minutes in a regular voice before she starts slurring again.    She denies any weakness, double vision, or trouble with swallowing. She notes her tongue feels thick, but it has felt this way since she bit it. She has no difficulty with eating. She underwent swallowing tests that were normal. She states she may have been numbed as it was not painful, but it was uncomfortable. She was told she did not need to follow up unless she \"had any trouble.\" She can no longer sing without sounding \"drunk.\" She denies Shortness of breath, double vision or fasciculations.    She notes she " has a crooked smile at her baseline. Her father and brother also have crooked smiles.    Prior Neurological Workup & History:  Hospitalized from 2024 to 2024 at [facility] for a sensation of her tongue feeling large resulting in biting her tongue multiple times, dysarthria, and family was worried about her having a stroke. She was admitted for stroke evaluation, which was unrevealing. MRI of the brain was negative for acute process. They did not feel this was an anaphylactic reaction. They did discuss possibly using steroids, but she was hesitant with her prior pancreatitis. She did have profound GERD reflux symptoms. They recommended she obtain work-up for treatment of those symptoms. She was evaluated inpatient by neurology. They noted no facial abnormalities with mild dysarthria. They felt that her symptoms at that time were not likely neurologic. She was discharged home.    She clarified she bit didi tongue really hard in 2023 and then the tongue thickness and speech changes began.     Other significant history includes asthma, anemia, prior pancreatitis x6 drug induced, prior breast cancer status post lumpectomy, hysterectomy, prior mono infection in , treated at an outlying facility for food impaction with EGD and positive H. pylori.     She did undergo MRI of the brain without contrast on 2024 showing chronic small vessel ischemic changes. No acute intracranial abnormalities. CTA of the head and neck showed patent bilateral carotid arteries and patent bilateral vertebral arteries. No central intracranial large vessel occlusions noted.     It appears on 2024, she was evaluated by PCP clinic with concerns of a diagnosis of right-sided Bell's palsy with right facial weakness. They treated her with intramuscular steroids.      She states her voice changed in the middle of 2023.  Her mother and all of her mother's siblings  with a stroke.       She follows  "with GI for H. Pylori, Dr. Soliman. She is currently eating a gluten-free diet.     Noted some intermittent tremor in left.     She has a family history of ALS in her brother who passed away when he was 55 years old. She states his ALS was caused by a severe closed head injury. She states her brother's first symptom of ALS was falling. She notes he would \"fall back like a tree.\" She reports when they tested her at the hospital, they ruled out ALS. Her other brother, who is 71 years old, has Lewy body dementia.     The following portions of the patient's history were reviewed and updated as appropriate: allergies, current medications, past family history, past medical history, past social history, past surgical history, and problem list.    Past Medical History:   Diagnosis Date    A-fib     Anemia     Asthma     Breast cancer 2012    LEFT    Gestational diabetes     Hx of radiation therapy 2012    LEFT BREAST CANCER    Hyperlipidemia     Kidney stone     Osteoarthritis     PONV (postoperative nausea and vomiting)     Varicella        Past Surgical History:   Procedure Laterality Date    BREAST EXCISIONAL BIOPSY Right 2011    BREAST LUMPECTOMY Left 2012    HYSTEROSCOPY      TOTAL ABDOMINAL HYSTERECTOMY WITH SALPINGO OOPHORECTOMY  2012    enlarged uterus due to tamoxifen reaction    WISDOM TOOTH EXTRACTION         Social History     Socioeconomic History    Marital status:    Tobacco Use    Smoking status: Never    Smokeless tobacco: Never   Vaping Use    Vaping status: Never Used   Substance and Sexual Activity    Alcohol use: No    Drug use: Never    Sexual activity: Yes     Partners: Male     Birth control/protection: Post-menopausal       Family History   Problem Relation Age of Onset    Diabetes Mother     Colon cancer Maternal Grandfather     Colon cancer Brother     Melanoma Sister     Breast cancer Neg Hx     Ovarian cancer Neg Hx           Current Outpatient Medications:     fluticasone (FLONASE) 50 " "MCG/ACT nasal spray, 2 sprays into the nostril(s) as directed by provider Daily., Disp: , Rfl:     LORATADINE ALLERGY RELIEF PO, Take  by mouth., Disp: , Rfl:     pantoprazole (PROTONIX) 40 MG EC tablet, Take 1 tablet by mouth Daily., Disp: , Rfl:     baclofen (LIORESAL) 10 MG tablet, Take 1 tablet by mouth 2 (Two) Times a Day As Needed for Muscle Spasms., Disp: 60 tablet, Rfl: 6     Review of Systems   HENT:  Positive for dental problem and voice change. Negative for congestion, drooling, ear discharge, ear pain, facial swelling, hearing loss, mouth sores, nosebleeds, postnasal drip, rhinorrhea, sinus pressure, sinus pain, sneezing, sore throat, tinnitus and trouble swallowing.         Right TMJ click and pain   Respiratory:  Negative for shortness of breath.    Cardiovascular:  Negative for chest pain.   Neurological:  Positive for speech difficulty. Negative for weakness and numbness.   All other systems reviewed and are negative.       Objective:  /90   Pulse 104   Ht 160 cm (63\")   Wt 71.2 kg (157 lb)   SpO2 94%   BMI 27.81 kg/m²     Neurologic Exam     Mental Status   Oriented to person, place, and time.   Speech: slurred   Level of consciousness: alert    Cranial Nerves     CN II   Visual fields full to confrontation.     CN III, IV, VI   Pupils are equal, round, and reactive to light.  Extraocular motions are normal.   CN III: no CN III palsy  CN VI: no CN VI palsy  Nystagmus: none   Diplopia: none  Ophthalmoparesis: none  Upgaze: normal  Downgaze: normal    CN V   Facial sensation intact.     CN VII   Right facial weakness: central (minimal right lower facial droop)  Left facial weakness: none    CN VIII   CN VIII normal.     CN IX, X   CN IX normal.   CN X normal.     CN XI   CN XI normal.     CN XII   CN XII normal.     Motor Exam   Muscle bulk: normal  Overall muscle tone: normal    Strength   Strength 5/5 throughout.     Gait, Coordination, and Reflexes     Gait  Gait: normal    Coordination "   Finger to nose coordination: normal    Tremor   Resting tremor: absent  Intention tremor: absent  Action tremor: absent    Reflexes   Reflexes 2+ except as noted.   Right : 2+  Left : 2+      Physical Exam  Constitutional:       Appearance: Normal appearance.   HENT:      Head:      Jaw: Tenderness, swelling, pain on movement (right TMJ click) and malocclusion present.   Eyes:      Extraocular Movements: EOM normal.      Pupils: Pupils are equal, round, and reactive to light.   Neurological:      Mental Status: She is alert and oriented to person, place, and time.      Motor: Motor strength is normal.     Coordination: Finger-Nose-Finger Test normal.      Gait: Gait is intact.   Psychiatric:         Mood and Affect: Mood is anxious.         Speech: Speech is slurred.         Behavior: Behavior normal.         Thought Content: Thought content normal.         Cognition and Memory: Cognition and memory normal.         Judgment: Judgment normal.         Assessment/Plan:       Diagnoses and all orders for this visit:    1. Dysarthria (Primary)  Comments:  awaiting emg/ncvs 4/9/2024    2. Facial droop  Comments:  awaiting emg/ncvs 4/9/2024    3. TMJ syndrome  -     baclofen (LIORESAL) 10 MG tablet; Take 1 tablet by mouth 2 (Two) Times a Day As Needed for Muscle Spasms.  Dispense: 60 tablet; Refill: 6    4. Right facial pain  -     baclofen (LIORESAL) 10 MG tablet; Take 1 tablet by mouth 2 (Two) Times a Day As Needed for Muscle Spasms.  Dispense: 60 tablet; Refill: 6         She continues to have no numbness, tingling, weakness, confusion, double vision, or loss of vision. She is reporting more TMJ symptoms. She saw her dentist and they state that she has TMJ syndrome. Her PCP recently put her on an anti-inflammatory. She has been referred to [facility]. She is pending to be scheduled. If they do not schedule her, she states that her dentist will refer her locally. I am placing her on baclofen to help  with her symptoms for now.    Recommend keeping appointment for EMG and NCVs to evaluate for ALS with strong family history.    Otherwise, she is doing well. Right facial droop is minimal today. She is going to follow up in 4 to 5 months for reevaluation of symptoms or sooner if needed. She verbalizes understanding and agrees with plan today.    Reviewed medications, potential side effects and signs and symptoms to report. Discussed risk versus benefits of treatment plan with patient and/or family-including medications, labs and radiology that may be ordered. Addressed questions and concerns during visit. Patient and/or family verbalized understanding and agree with plan.    During this visit the following were done:  Labs Reviewed [x]    Labs Ordered []    Radiology Reports Reviewed [x]    Radiology Ordered []    PCP Records Reviewed []    Referring Provider Records Reviewed []    ER Records Reviewed []    Hospital Records Reviewed []    History Obtained From Family []    Radiology Images Reviewed []    Other Reviewed [x]  ENT and SLP consult notes reviewed  Records Requested []      Transcribed from ambient dictation for DUTCH Shanks by Jaja Kirby.  03/28/24   12:22 EDT    Patient or patient representative verbalized consent to the visit recording.  I have personally performed the services described in this document as transcribed by the above individual, and it is both accurate and complete.  DUTCH Shanks  3/28/2024  16:30 EDT     Note to patient: The 21st Century Cures Act makes medical notes like these available to patients in the interest of transparency. However, be advised this is a medical document. It is intended as peer to peer communication. It is written in medical language and may contain abbreviations or verbiage that are unfamiliar. It may appear blunt or direct. Medical documents are intended to carry relevant information, facts as evident, and the clinical opinion of  the provider.

## 2024-04-09 ENCOUNTER — HOSPITAL ENCOUNTER (OUTPATIENT)
Dept: NEUROLOGY | Facility: HOSPITAL | Age: 64
Discharge: HOME | End: 2024-04-09
Admitting: NURSE PRACTITIONER
Payer: COMMERCIAL

## 2024-04-09 DIAGNOSIS — R49.0 HYPOPHONIA WITH HOARSENESS: ICD-10-CM

## 2024-04-09 DIAGNOSIS — R29.810 FACIAL DROOP: ICD-10-CM

## 2024-04-09 DIAGNOSIS — Z82.0 FAMILY HX OF ALS (AMYOTROPHIC LATERAL SCLEROSIS): ICD-10-CM

## 2024-04-09 DIAGNOSIS — G70.00 MYASTHENIA GRAVIS WITHOUT EXACERBATION: ICD-10-CM

## 2024-04-09 DIAGNOSIS — R47.1 DYSARTHRIA: ICD-10-CM

## 2024-04-09 PROCEDURE — 95910 NRV CNDJ TEST 7-8 STUDIES: CPT | Performed by: PSYCHIATRY & NEUROLOGY

## 2024-04-09 PROCEDURE — 95910 NRV CNDJ TEST 7-8 STUDIES: CPT

## 2024-04-09 PROCEDURE — 95886 MUSC TEST DONE W/N TEST COMP: CPT

## 2024-04-09 PROCEDURE — 95886 MUSC TEST DONE W/N TEST COMP: CPT | Performed by: PSYCHIATRY & NEUROLOGY

## 2024-04-09 PROCEDURE — 95937 NEUROMUSCULAR JUNCTION TEST: CPT

## 2024-04-09 PROCEDURE — 95937 NEUROMUSCULAR JUNCTION TEST: CPT | Performed by: PSYCHIATRY & NEUROLOGY

## 2024-04-10 ENCOUNTER — TELEPHONE (OUTPATIENT)
Dept: NEUROLOGY | Facility: CLINIC | Age: 64
End: 2024-04-10
Payer: COMMERCIAL

## 2024-04-10 NOTE — TELEPHONE ENCOUNTER
Patient called in and I let her know the results. She stated that she read the paper on the Pantoprazole and all her symptoms were side effects. She stated that she stopped the medication and her symptoms have improved. I told her to call us next week and let us know how she is

## 2024-04-10 NOTE — PROGRESS NOTES
Please let Jackelyn know that the Nerve and muscle study of her left arm, neck, leg and back are all normal. There is no evidence of ALS or other muscle or nerve abnormalities. Please fax copy of results to PCP on file along with Dr. Sravan COELHO at  and Kely NGUYEN at [facility]. Thanks, DUTCH [place]

## 2024-04-10 NOTE — TELEPHONE ENCOUNTER
----- Message from DUTCH Shanks sent at 4/10/2024  7:26 AM EDT -----  Please let Jackelyn know that the Nerve and muscle study of her left arm, neck, leg and back are all normal. There is no evidence of ALS or other muscle or nerve abnormalities. Please fax copy of results to PCP on file along with Dr. Sravan COELHO at  and Kely NGUYEN at [facility]. Thanks, DUTCH [place]

## 2024-04-10 NOTE — TELEPHONE ENCOUNTER
Thank you for the update. She follows with Gastroenterology so she should also notify their clinic. -DUTCH [place]

## 2024-08-01 ENCOUNTER — OFFICE VISIT (OUTPATIENT)
Dept: NEUROLOGY | Facility: CLINIC | Age: 64
End: 2024-08-01
Payer: COMMERCIAL

## 2024-08-01 VITALS
BODY MASS INDEX: 25.87 KG/M2 | SYSTOLIC BLOOD PRESSURE: 128 MMHG | WEIGHT: 146 LBS | HEIGHT: 63 IN | DIASTOLIC BLOOD PRESSURE: 82 MMHG | HEART RATE: 56 BPM | OXYGEN SATURATION: 99 %

## 2024-08-01 DIAGNOSIS — R29.810 FACIAL DROOP: ICD-10-CM

## 2024-08-01 DIAGNOSIS — R47.1 DYSARTHRIA: Primary | ICD-10-CM

## 2024-08-01 DIAGNOSIS — K14.9 TONGUE DISORDER: ICD-10-CM

## 2024-08-01 PROBLEM — Z88.9 HISTORY OF MULTIPLE ALLERGIES: Status: ACTIVE | Noted: 2024-03-01

## 2024-08-01 PROBLEM — Z87.19 HISTORY OF ESOPHAGEAL STRICTURE: Status: ACTIVE | Noted: 2024-04-01

## 2024-08-01 PROBLEM — M26.609 TEMPOROMANDIBULAR JOINT DISORDER: Status: ACTIVE | Noted: 2024-03-15

## 2024-08-01 PROCEDURE — 99214 OFFICE O/P EST MOD 30 MIN: CPT | Performed by: NURSE PRACTITIONER

## 2024-08-01 NOTE — PROGRESS NOTES
Subjective:     Patient ID: Jackelyn Diaz is a 63 y.o. female.    CC:   Chief Complaint   Patient presents with    Dysarthria       HPI:   History of Present Illness  This is a very pleasant 63-year-old female who presents for a 4-month neurology follow-up. She was last seen in clinic on 03/28/2024 for hypophonia and dysarthria. She has had extensive neurological work-up, has worked with speech language pathology, does have a history of ALS in her brother. I have prescribed some baclofen for TMJ and right facial pain since her last visit to the clinic. On 04/09/2024, she was evaluated by gastroenterology at [facility] for chronic recurrent pancreatitis. On 05/28/2024, she also underwent an endoscopy with Dr. Eduardo Sotelo, [facility]. She is here for follow-up and reevaluation of symptoms today.    She reports feeling well today. Her voice improved after discontinuing the pantoprazole, but worsened after starting allergy injections. She has increased salivation, tongue shaking, and is unable to roll her tongue. She believes her right lower face remains paralyzed due to Bell's palsy, but her eye did not droop. She discontinued pantoprazole 4 months ago. If she uses a nasal spray or loratadine, she is unable to talk all day. She experiences right-sided facial pain and TMJ issues, particularly on the inside of her tongue and lip. She has not taken a muscle relaxer. She has difficulty swallowing and has difficulty moving her food past her tongue. She denies any weakness, numbness, tingling, shortness of breath, double vision, or headaches. She has a loss of appetite. Her TMJ becomes irritated when she talks extensively. She maintains an active lifestyle, including working out, watching her grandchildren, and cooking. She has lost weight due to difficulty eating.    Her PCP has referred her to [facility] ASAP for further evaluation and workup.    Neurology, ENT and GI local workups have  been unremarkable.    Prior Neurological Workup & History:  Hospitalized from 2024 to 2024 at [facility] for a sensation of her tongue feeling large resulting in biting her tongue multiple times, dysarthria, and family was worried about her having a stroke. She was admitted for stroke evaluation, which was unrevealing. MRI of the brain was negative for acute process. They did not feel this was an anaphylactic reaction. They did discuss possibly using steroids, but she was hesitant with her prior pancreatitis. She did have profound GERD reflux symptoms. They recommended she obtain work-up for treatment of those symptoms. She was evaluated inpatient by neurology. They noted no facial abnormalities with mild dysarthria. They felt that her symptoms at that time were not likely neurologic. She was discharged home.     She clarified she bit didi tongue really hard in 2023 and then the tongue thickness and speech changes began.     Other significant history includes asthma, anemia, prior pancreatitis x6 drug induced, prior breast cancer status post lumpectomy, hysterectomy, [facility] with EGD and positive H. pylori.     She did undergo MRI of the brain without contrast on 2024 showing chronic small vessel ischemic changes. No acute intracranial abnormalities. CTA of the head and neck showed patent bilateral carotid arteries and patent bilateral vertebral arteries. No central intracranial large vessel occlusions noted.     It appears on 2024, she was evaluated by PCP clinic with concerns of a diagnosis of right-sided Bell's palsy with right facial weakness. They treated her with intramuscular steroids.      She states her voice changed in the middle of 2023.  Her mother and all of her mother's siblings  with a stroke.       She follows with GI for H. Pylori, Dr. Soliman. She is currently eating a  "gluten-free diet.     Noted some intermittent tremor in left.     She has a family history of ALS in her brother who passed away when he was 55 years old. She states his ALS was caused by a severe closed head injury. She states her brother's first symptom of ALS was falling. She notes he would \"fall back like a tree.\" She reports when they tested her at the hospital, they ruled out ALS. Her other brother, who is 71 years old, has Lewy body dementia.       I initially saw her in clinic on 01/19/2024, that was after [facility] admission for symptoms of her tongue feeling large, dysarthria, and concerns of stroke. Her stroke evaluation was unrevealing. She had a right lower facial droop. Otherwise, neurological exam was intact with no fasciculations.     She did have some note of hypophonia and dysarthria. She was evaluated by Dr. Sravan Noonan at  [facility] on 02/08/2024. He evaluated her and felt that she did not have any laryngeal voice issues. He thought that she had more dysarthria than dysphonia. He felt that she had a lingering sequelae of tongue injury from 12/2023. He suspected she would likely continue to improve over time.     We did complete some extensive labs on 01/25/2024 including MUSK antibody negative, neuromyelitis optica antibody IgG negative, anti-myelin oligodendrocyte glycoprotein serum negative, myasthenia gravis profile negative, and MUSK antibodies negative.     She also was evaluated by Patricia Brown MS, CCC-SLP, with speech language pathology at  [facility] on 02/08/2024. She does have a family history of ALS. She is here for reevaluation of her symptoms today.     Noted to have TMJ click. [facility], but they are \"not having any luck.\" She states  advised she had to be on a soft food diet for 6 weeks before they see her. After she speaks, her jaw feels tired.  Baclofen was not helpful.    She can no longer sing without sounding \"drunk.\" She denies " "Shortness of breath, double vision or fasciculations.     She notes she has a crooked smile at her baseline. Her father and brother also have crooked smiles.    Suspect pantoprazole exacerbated symptoms and was discontinued.     The following portions of the patient's history were reviewed and updated as appropriate: allergies, current medications, past family history, past medical history, past social history, past surgical history, and problem list.    Past Medical History:   Diagnosis Date    A-fib     Anemia     Asthma     Breast cancer 2012    LEFT    Gestational diabetes     Hx of radiation therapy 2012    LEFT BREAST CANCER    Hyperlipidemia     Kidney stone     Osteoarthritis     PONV (postoperative nausea and vomiting)     Varicella        Past Surgical History:   Procedure Laterality Date    BREAST EXCISIONAL BIOPSY Right 2011    BREAST LUMPECTOMY Left 2012    HYSTEROSCOPY      TOTAL ABDOMINAL HYSTERECTOMY WITH SALPINGO OOPHORECTOMY  2012    enlarged uterus due to tamoxifen reaction    WISDOM TOOTH EXTRACTION         Social History     Socioeconomic History    Marital status:    Tobacco Use    Smoking status: Never    Smokeless tobacco: Never   Vaping Use    Vaping status: Never Used   Substance and Sexual Activity    Alcohol use: No    Drug use: Never    Sexual activity: Yes     Partners: Male     Birth control/protection: Post-menopausal       Family History   Problem Relation Age of Onset    Diabetes Mother     Colon cancer Maternal Grandfather     Colon cancer Brother     Melanoma Sister     Breast cancer Neg Hx     Ovarian cancer Neg Hx         No current outpatient medications on file.     Review of Systems   Constitutional:  Positive for appetite change.   HENT:  Positive for trouble swallowing and voice change.    Neurological:  Positive for speech difficulty.   All other systems reviewed and are negative.       Objective:  /82   Pulse 56   Ht 160 cm (63\")   Wt 66.2 kg (146 lb)   " SpO2 99%   BMI 25.86 kg/m²     Neurologic Exam     Mental Status   Oriented to person, place, and time.   Speech: slurred   Level of consciousness: alert    Marked dysarthria, worsened compared to prior visit     Cranial Nerves     CN II   Visual fields full to confrontation.     CN III, IV, VI   Extraocular motions are normal.     CN VII   Right facial weakness: central  Left facial weakness: none    CN VIII   CN VIII normal.     CN IX, X   CN IX normal.   CN X normal.     CN XI   CN XI normal.     CN XII   Tongue: not atrophic  Fasciculations: absent  Tongue deviation: none    Feels as her tongue is thick and does not move as it should     Gait, Coordination, and Reflexes     Gait  Gait: normal      Physical Exam  Constitutional:       Appearance: Normal appearance.   Eyes:      Extraocular Movements: EOM normal.   Neurological:      Mental Status: She is alert and oriented to person, place, and time.      Gait: Gait is intact.   Psychiatric:         Mood and Affect: Affect is tearful (due to her condition).         Speech: Speech is slurred.         Results:  Results  Testing  EMG and NCVS (4/9/2024) was normal. No evidence of motor neuron disease, myopathy, or neuropathy as per our Neurologist, Dr. Sergo Hernadez.    Assessment/Plan:     Diagnoses and all orders for this visit:    1. Dysarthria (Primary)  Comments:  being referred to [facility] by PCP    2. Facial droop  Comments:  being referred to [facility] by PCP    3. Tongue disorder  Comments:  being referred to [facility] by PCP           Assessment & Plan  Total time of visit today was 30 minutes which included reviewing ENT and gastroenterology notes.  Reviewing EMG and NCVS results.  Discussing her findings and recommendations in detail moving forward.  I highly agree with [facility] neurology consultation made by PCP.      She reports today during checkout that she is going to have a cyst removed from her finger on 08/14/2024.  She has had exacerbation of her symptoms with 2 allergy injections, pantoprazole, Flonase, Claritin, and other conditions, it is still unclear the etiology of her dysarthria and change in speech. I have recommended that she wait on any sedation or additional procedures for now with as many adverse reactions as she has had so far.     She has no other weakness, numbness, tingling, confusion, double vision, fatigue, shortness of breath, or any other symptoms. She is hopeful to get answers soon. She never had to take the baclofen. She has the TMJ, but no pain.     She is going to see [facility] and I would recommend she defer any surgical procedures until she is evaluated by [facility] for her symptoms.     We have scheduled a follow-up for 03/2025 for reevaluation. If they wish to take over her care completely, she can always cancel that appointment. She should continue with all of her specialists and call with any questions or concerns. She verbalized understanding and agrees with plan today.    Reviewed medications, potential side effects and signs and symptoms to report. Discussed risk versus benefits of treatment plan with patient and/or family-including medications, labs and radiology that may be ordered. Addressed questions and concerns during visit. Patient and/or family verbalized understanding and agree with plan.    During this visit the following were done:  Labs Reviewed [x]    Labs Ordered []    Radiology Reports Reviewed [x]    Radiology Ordered []    PCP Records Reviewed []    Referring Provider Records Reviewed []    ER Records Reviewed []    Hospital Records Reviewed []    History Obtained From Family []    Radiology Images Reviewed []    Other Reviewed [x]  Emg/ncvs, gi workup notes  Records Requested []      08/01/24   13:38 EDT    Patient or patient representative verbalized consent for the use of Ambient Listening during the visit with  [facility] for chart  documentation. 8/1/2024  14:44 EDT    Note to patient: The 21st Century Cures Act makes medical notes like these available to patients in the interest of transparency. However, be advised this is a medical document. It is intended as peer to peer communication. It is written in medical language and may contain abbreviations or verbiage that are unfamiliar. It may appear blunt or direct. Medical documents are intended to carry relevant information, facts as evident, and the clinical opinion of the provider.

## 2024-08-01 NOTE — LETTER
August 1, 2024     GISELA Lopez  [address] [place] [facility] [address]    Patient: Jackelyn Diaz   YOB: 1960   Date of Visit: 8/1/2024       Dear GISELA Lopez    Jackelyn Diaz was in my office today. Below is a copy of my note.    If you have questions, please do not hesitate to call me. I look forward to following Jackelyn along with you.         Sincerely,        DUTCH Shanks        CC: MD Sravan Morales MD    Subjective:     Patient ID: Jackelyn Diaz is a 63 y.o. female.    CC:   Chief Complaint   Patient presents with   • Dysarthria       HPI:   History of Present Illness  This is a very pleasant 63-year-old female who presents for a 4-month neurology follow-up. She was last seen in clinic on 03/28/2024 for hypophonia and dysarthria. She has had extensive neurological work-up, has worked with speech language pathology, does have a history of ALS in her brother. I have prescribed some baclofen for TMJ and right facial pain since her last visit to the clinic. On 04/09/2024, she was evaluated by gastroenterology at [place] for chronic recurrent pancreatitis. On 05/28/2024, she also underwent an endoscopy with Dr. Eduardo Sotelo, [facility]. She is here for follow-up and reevaluation of symptoms today.    She reports feeling well today. Her voice improved after discontinuing the pantoprazole, but worsened after starting allergy injections. She has increased salivation, tongue shaking, and is unable to roll her tongue. She believes her right lower face remains paralyzed due to Bell's palsy, but her eye did not droop. She discontinued pantoprazole 4 months ago. If she uses a nasal spray or loratadine, she is unable to talk all day. She experiences right-sided facial pain and TMJ issues, particularly on the inside of her tongue and lip. She has not taken a muscle relaxer. She has difficulty swallowing and has difficulty moving her food past her  tongue. She denies any weakness, numbness, tingling, shortness of breath, double vision, or headaches. She has a loss of appetite. Her TMJ becomes irritated when she talks extensively. She maintains an active lifestyle, including working out, watching her grandchildren, and cooking. She has lost weight due to difficulty eating.    Her PCP has referred her to [facility] ASAP for further evaluation and workup.    Neurology, ENT and GI local workups have been unremarkable.    Prior Neurological Workup & History:  Hospitalized from 01/01/2024 to 01/02/2024 at [facility] for a sensation of her tongue feeling large resulting in biting her tongue multiple times, dysarthria, and family was worried about her having a stroke. She was admitted for stroke evaluation, which was unrevealing. MRI of the brain was negative for acute process. They did not feel this was an anaphylactic reaction. They did discuss possibly using steroids, but she was hesitant with her prior pancreatitis. She did have profound GERD reflux symptoms. They recommended she obtain work-up for treatment of those symptoms. She was evaluated inpatient by neurology. They noted no facial abnormalities with mild dysarthria. They felt that her symptoms at that time were not likely neurologic. She was discharged home.     She clarified she bit didi tongue really hard in December 2023 and then the tongue thickness and speech changes began.     Other significant history includes asthma, anemia, prior pancreatitis x6 drug induced, prior breast cancer status post lumpectomy, hysterectomy, prior mono infection in 2022, treated at an outlying facility for food impaction with EGD and positive H. pylori.     She did undergo MRI of the brain without contrast on 01/01/2024 showing chronic small vessel ischemic changes. No acute intracranial abnormalities. CTA of the head and neck showed patent bilateral carotid arteries and patent bilateral  "vertebral arteries. No central intracranial large vessel occlusions noted.     It appears on 2024, she was evaluated by PCP clinic with concerns of a diagnosis of right-sided Bell's palsy with right facial weakness. They treated her with intramuscular steroids.      She states her voice changed in the middle of 2023.  Her mother and all of her mother's siblings  with a stroke.       She follows with GI for H. Pylori, Dr. Soliman. She is currently eating a gluten-free diet.     Noted some intermittent tremor in left.     She has a family history of ALS in her brother who passed away when he was 55 years old. She states his ALS was caused by a severe closed head injury. She states her brother's first symptom of ALS was falling. She notes he would \"fall back like a tree.\" She reports when they tested her at the hospital, they ruled out ALS. Her other brother, who is 71 years old, has Lewy body dementia.       I initially saw her in clinic on 2024, that was after [facility] ER admission for symptoms of her tongue feeling large, dysarthria, and concerns of stroke. Her stroke evaluation was unrevealing. She had a right lower facial droop. Otherwise, neurological exam was intact with no fasciculations.     She did have some note of hypophonia and dysarthria. She was evaluated by Dr. Sravan Noonan at  [facility] on 2024. He evaluated her and felt that she did not have any laryngeal voice issues. He thought that she had more dysarthria than dysphonia. He felt that she had a lingering sequelae of tongue injury from 2023. He suspected she would likely continue to improve over time.     We did complete some extensive labs on 2024 including MUSK antibody negative, neuromyelitis optica antibody IgG negative, anti-myelin oligodendrocyte glycoprotein serum negative, myasthenia gravis profile negative, and MUSK antibodies negative.     She also was evaluated by Patricia Brown MS, CCC-SLP, with " "speech language pathology at  [facility] on 02/08/2024. She does have a family history of ALS. She is here for reevaluation of her symptoms today.     Noted to have TMJ click. [facility], but they are \"not having any luck.\" She states  advised she had to be on a soft food diet for 6 weeks before they see her. After she speaks, her jaw feels tired.  Baclofen was not helpful.    She can no longer sing without sounding \"drunk.\" She denies Shortness of breath, double vision or fasciculations.     She notes she has a crooked smile at her baseline. Her father and brother also have crooked smiles.    Suspect pantoprazole exacerbated symptoms and was discontinued.     The following portions of the patient's history were reviewed and updated as appropriate: allergies, current medications, past family history, past medical history, past social history, past surgical history, and problem list.    Past Medical History:   Diagnosis Date   • A-fib    • Anemia    • Asthma    • Breast cancer 2012    LEFT   • Gestational diabetes    • Hx of radiation therapy 2012    LEFT BREAST CANCER   • Hyperlipidemia    • Kidney stone    • Osteoarthritis    • PONV (postoperative nausea and vomiting)    • Varicella        Past Surgical History:   Procedure Laterality Date   • BREAST EXCISIONAL BIOPSY Right 2011   • BREAST LUMPECTOMY Left 2012   • HYSTEROSCOPY     • TOTAL ABDOMINAL HYSTERECTOMY WITH SALPINGO OOPHORECTOMY  2012    enlarged uterus due to tamoxifen reaction   • WISDOM TOOTH EXTRACTION         Social History     Socioeconomic History   • Marital status:    Tobacco Use   • Smoking status: Never   • Smokeless tobacco: Never   Vaping Use   • Vaping status: Never Used   Substance and Sexual Activity   • Alcohol use: No   • Drug use: Never   • Sexual activity: Yes     Partners: Male     Birth control/protection: Post-menopausal       Family History   Problem Relation Age of Onset   • Diabetes " "Mother    • Colon cancer Maternal Grandfather    • Colon cancer Brother    • Melanoma Sister    • Breast cancer Neg Hx    • Ovarian cancer Neg Hx         No current outpatient medications on file.     Review of Systems   Constitutional:  Positive for appetite change.   HENT:  Positive for trouble swallowing and voice change.    Neurological:  Positive for speech difficulty.   All other systems reviewed and are negative.       Objective:  /82   Pulse 56   Ht 160 cm (63\")   Wt 66.2 kg (146 lb)   SpO2 99%   BMI 25.86 kg/m²     Neurologic Exam     Mental Status   Oriented to person, place, and time.   Speech: slurred   Level of consciousness: alert    Marked dysarthria, worsened compared to prior visit     Cranial Nerves     CN II   Visual fields full to confrontation.     CN III, IV, VI   Extraocular motions are normal.     CN VII   Right facial weakness: central  Left facial weakness: none    CN VIII   CN VIII normal.     CN IX, X   CN IX normal.   CN X normal.     CN XI   CN XI normal.     CN XII   Tongue: not atrophic  Fasciculations: absent  Tongue deviation: none    Feels as her tongue is thick and does not move as it should     Gait, Coordination, and Reflexes     Gait  Gait: normal      Physical Exam  Constitutional:       Appearance: Normal appearance.   Eyes:      Extraocular Movements: EOM normal.   Neurological:      Mental Status: She is alert and oriented to person, place, and time.      Gait: Gait is intact.   Psychiatric:         Mood and Affect: Affect is tearful (due to her condition).         Speech: Speech is slurred.         Results:  Results  Testing  EMG and NCVS (4/9/2024) was normal. No evidence of motor neuron disease, myopathy, or neuropathy as per our Neurologist, Dr. Sergo Hernadez.    Assessment/Plan:     Diagnoses and all orders for this visit:    1. Dysarthria (Primary)  Comments:  being referred to [facility] by PCP    2. Facial droop  Comments:  being referred to [place] " clinic by PCP    3. Tongue disorder  Comments:  being referred to [facility] by PCP           Assessment & Plan  Total time of visit today was 30 minutes which included reviewing ENT and gastroenterology notes.  Reviewing EMG and NCVS results.  Discussing her findings and recommendations in detail moving forward.  I highly agree with [facility] neurology consultation made by PCP.      She reports today during checkout that she is going to have a cyst removed from her finger on 08/14/2024. She has had exacerbation of her symptoms with 2 allergy injections, pantoprazole, Flonase, Claritin, and other conditions, it is still unclear the etiology of her dysarthria and change in speech. I have recommended that she wait on any sedation or additional procedures for now with as many adverse reactions as she has had so far.     She has no other weakness, numbness, tingling, confusion, double vision, fatigue, shortness of breath, or any other symptoms. She is hopeful to get answers soon. She never had to take the baclofen. She has the TMJ, but no pain.     She is going to see [facility] and I would recommend she defer any surgical procedures until she is evaluated by [facility] for her symptoms.     We have scheduled a follow-up for 03/2025 for reevaluation. If they wish to take over her care completely, she can always cancel that appointment. She should continue with all of her specialists and call with any questions or concerns. She verbalized understanding and agrees with plan today.    Reviewed medications, potential side effects and signs and symptoms to report. Discussed risk versus benefits of treatment plan with patient and/or family-including medications, labs and radiology that may be ordered. Addressed questions and concerns during visit. Patient and/or family verbalized understanding and agree with plan.    During this visit the following were done:  Labs Reviewed [x]    Labs  Ordered []    Radiology Reports Reviewed [x]    Radiology Ordered []    PCP Records Reviewed []    Referring Provider Records Reviewed []    ER Records Reviewed []    Hospital Records Reviewed []    History Obtained From Family []    Radiology Images Reviewed []    Other Reviewed [x]  Emg/ncvs, gi workup notes  Records Requested []      08/01/24   13:38 EDT    Patient or patient representative verbalized consent for the use of Ambient Listening during the visit with  DUTCH Shanks for chart documentation. 8/1/2024  14:44 EDT    Note to patient: The 21st Century Cures Act makes medical notes like these available to patients in the interest of transparency. However, be advised this is a medical document. It is intended as peer to peer communication. It is written in medical language and may contain abbreviations or verbiage that are unfamiliar. It may appear blunt or direct. Medical documents are intended to carry relevant information, facts as evident, and the clinical opinion of the provider.

## 2024-08-23 ENCOUNTER — LAB REQUISITION (OUTPATIENT)
Dept: LAB | Facility: HOSPITAL | Age: 64
End: 2024-08-23
Payer: COMMERCIAL

## 2024-08-23 DIAGNOSIS — M19.042 PRIMARY OSTEOARTHRITIS, LEFT HAND: ICD-10-CM

## 2024-08-23 PROCEDURE — 88304 TISSUE EXAM BY PATHOLOGIST: CPT | Performed by: PLASTIC SURGERY

## 2024-08-26 LAB
CYTO UR: NORMAL
LAB AP CASE REPORT: NORMAL
LAB AP CLINICAL INFORMATION: NORMAL
PATH REPORT.FINAL DX SPEC: NORMAL
PATH REPORT.GROSS SPEC: NORMAL

## 2024-12-30 ENCOUNTER — TELEPHONE (OUTPATIENT)
Dept: NEUROLOGY | Facility: CLINIC | Age: 64
End: 2024-12-30
Payer: COMMERCIAL

## 2024-12-30 NOTE — TELEPHONE ENCOUNTER
12/30/2024 at 4PM-Received a call from PCP GISELA Knight, in regards to patient. I saw her 8/1/2024 and sent her to [facility] for consultation due to persistent dysarthria. She was then [facility] and noted to have persistent hypoglossal nerve palsy and referred to Dr. Joe Delacruz at  ALS clinic. 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 Discussion of treatment was noted pending 1 more genetic test. Patient unable to clear secretions and had a spell of turning blue reported to PCP by family. I explained that we had consulted other Neurologists due to her workup not revealing etiology and being beyond the scope of our general neurology practice. I recommended he have patient go to  emergency room so she could begin treatment for the suspected ALS diagnosis. He verbalized understanding and agrees with plan. DUTCH Easton

## 2025-03-05 ENCOUNTER — TRANSCRIBE ORDERS (OUTPATIENT)
Dept: ADMINISTRATIVE | Facility: HOSPITAL | Age: 65
End: 2025-03-05
Payer: COMMERCIAL

## 2025-03-05 DIAGNOSIS — Z12.31 ENCOUNTER FOR SCREENING MAMMOGRAM FOR BREAST CANCER: Primary | ICD-10-CM

## 2025-03-28 ENCOUNTER — HOSPITAL ENCOUNTER (OUTPATIENT)
Facility: HOSPITAL | Age: 65
Discharge: HOME | End: 2025-03-28
Payer: COMMERCIAL

## 2025-03-28 DIAGNOSIS — R22.2 MASS IN CHEST: ICD-10-CM

## 2025-03-28 DIAGNOSIS — Z12.31 ENCOUNTER FOR SCREENING MAMMOGRAM FOR BREAST CANCER: ICD-10-CM

## 2025-03-28 LAB — NCCN CRITERIA FLAG: ABNORMAL

## 2025-03-28 PROCEDURE — 76642 ULTRASOUND BREAST LIMITED: CPT

## 2025-03-28 PROCEDURE — G0279 TOMOSYNTHESIS, MAMMO: HCPCS

## 2025-03-28 PROCEDURE — 77066 DX MAMMO INCL CAD BI: CPT

## 2025-03-29 ENCOUNTER — RESULTS FOLLOW-UP (OUTPATIENT)
Facility: HOSPITAL | Age: 65
End: 2025-03-29
Payer: COMMERCIAL

## 2025-03-29 NOTE — PROGRESS NOTES
I sent the patient a [employer] message asking for a call to discuss assessment results.

## 2025-04-11 NOTE — PROGRESS NOTES
I left a voice mail and previously sent a [employer] message requesting a return call to discuss risk assessment results.

## 2025-04-16 ENCOUNTER — DOCUMENTATION (OUTPATIENT)
Dept: GENETICS | Facility: HOSPITAL | Age: 65
End: 2025-04-16
Payer: COMMERCIAL

## 2025-04-16 NOTE — PROGRESS NOTES
Meets NCCN Criteria for Genetic Testing  Declines genetic testing? Y   Reason for decline? Could Not Reach Patient   If Reason for Decline is Previous Testing    Amb CARE     Non-CARE     Non-CARE Confirmation    Navigator Confirmed?     Patient Reported?     Elevated Tyrer-Cuzick Score ? Or Equal to 20%    Declines referral?     Reason for decline?

## 2025-04-16 NOTE — PROGRESS NOTES
This patient recently completed the CARE risk assessment for a mammogram appointment. Based on the patient's responses, NCCN criteria for genetic testing was met. At the time of the assessment, the patient was provided with both written and video educational materials regarding genetic testing.    Navigator follow-up:   Attempts to reach the patient to discuss the risk assessment results have been unsuccessful.